# Patient Record
Sex: FEMALE | Race: WHITE | NOT HISPANIC OR LATINO | Employment: OTHER | URBAN - METROPOLITAN AREA
[De-identification: names, ages, dates, MRNs, and addresses within clinical notes are randomized per-mention and may not be internally consistent; named-entity substitution may affect disease eponyms.]

---

## 2023-04-17 LAB
ALANINE AMINOTRANSFERASE (SGPT) (U/L) IN SER/PLAS: 25 U/L (ref 7–45)
ALBUMIN (G/DL) IN SER/PLAS: 4.2 G/DL (ref 3.4–5)
ALKALINE PHOSPHATASE (U/L) IN SER/PLAS: 74 U/L (ref 33–136)
ANION GAP IN SER/PLAS: 12 MMOL/L (ref 10–20)
ASPARTATE AMINOTRANSFERASE (SGOT) (U/L) IN SER/PLAS: 28 U/L (ref 9–39)
BILIRUBIN TOTAL (MG/DL) IN SER/PLAS: 0.7 MG/DL (ref 0–1.2)
CALCIUM (MG/DL) IN SER/PLAS: 9.3 MG/DL (ref 8.6–10.3)
CARBON DIOXIDE, TOTAL (MMOL/L) IN SER/PLAS: 25 MMOL/L (ref 21–32)
CHLORIDE (MMOL/L) IN SER/PLAS: 105 MMOL/L (ref 98–107)
CREATININE (MG/DL) IN SER/PLAS: 0.58 MG/DL (ref 0.5–1.05)
ERYTHROCYTE DISTRIBUTION WIDTH (RATIO) BY AUTOMATED COUNT: 13.2 % (ref 11.5–14.5)
ERYTHROCYTE MEAN CORPUSCULAR HEMOGLOBIN CONCENTRATION (G/DL) BY AUTOMATED: 33.3 G/DL (ref 32–36)
ERYTHROCYTE MEAN CORPUSCULAR VOLUME (FL) BY AUTOMATED COUNT: 83 FL (ref 80–100)
ERYTHROCYTES (10*6/UL) IN BLOOD BY AUTOMATED COUNT: 5.41 X10E12/L (ref 4–5.2)
GFR FEMALE: 90 ML/MIN/1.73M2
GLUCOSE (MG/DL) IN SER/PLAS: 100 MG/DL (ref 74–99)
HEMATOCRIT (%) IN BLOOD BY AUTOMATED COUNT: 45 % (ref 36–46)
HEMOGLOBIN (G/DL) IN BLOOD: 15 G/DL (ref 12–16)
LEUKOCYTES (10*3/UL) IN BLOOD BY AUTOMATED COUNT: 5 X10E9/L (ref 4.4–11.3)
NRBC (PER 100 WBCS) BY AUTOMATED COUNT: 0 /100 WBC (ref 0–0)
PLATELETS (10*3/UL) IN BLOOD AUTOMATED COUNT: 230 X10E9/L (ref 150–450)
POTASSIUM (MMOL/L) IN SER/PLAS: 3.9 MMOL/L (ref 3.5–5.3)
PROTEIN TOTAL: 6.6 G/DL (ref 6.4–8.2)
SODIUM (MMOL/L) IN SER/PLAS: 138 MMOL/L (ref 136–145)
THYROTROPIN (MIU/L) IN SER/PLAS BY DETECTION LIMIT <= 0.05 MIU/L: 1.43 MIU/L (ref 0.44–3.98)
UREA NITROGEN (MG/DL) IN SER/PLAS: 11 MG/DL (ref 6–23)

## 2023-05-15 LAB
ALANINE AMINOTRANSFERASE (SGPT) (U/L) IN SER/PLAS: 16 U/L (ref 7–45)
ALBUMIN (G/DL) IN SER/PLAS: 4.3 G/DL (ref 3.4–5)
ALKALINE PHOSPHATASE (U/L) IN SER/PLAS: 64 U/L (ref 33–136)
ANION GAP IN SER/PLAS: 13 MMOL/L (ref 10–20)
ASPARTATE AMINOTRANSFERASE (SGOT) (U/L) IN SER/PLAS: 19 U/L (ref 9–39)
BILIRUBIN TOTAL (MG/DL) IN SER/PLAS: 0.7 MG/DL (ref 0–1.2)
CALCIUM (MG/DL) IN SER/PLAS: 9.6 MG/DL (ref 8.6–10.3)
CARBON DIOXIDE, TOTAL (MMOL/L) IN SER/PLAS: 25 MMOL/L (ref 21–32)
CHLORIDE (MMOL/L) IN SER/PLAS: 107 MMOL/L (ref 98–107)
CREATININE (MG/DL) IN SER/PLAS: 0.56 MG/DL (ref 0.5–1.05)
GFR FEMALE: 90 ML/MIN/1.73M2
GLUCOSE (MG/DL) IN SER/PLAS: 92 MG/DL (ref 74–99)
POTASSIUM (MMOL/L) IN SER/PLAS: 3.9 MMOL/L (ref 3.5–5.3)
PROTEIN TOTAL: 6.8 G/DL (ref 6.4–8.2)
SODIUM (MMOL/L) IN SER/PLAS: 141 MMOL/L (ref 136–145)
THYROPEROXIDASE AB (IU/ML) IN SER/PLAS: <28 IU/ML
THYROTROPIN (MIU/L) IN SER/PLAS BY DETECTION LIMIT <= 0.05 MIU/L: 2.17 MIU/L (ref 0.44–3.98)
UREA NITROGEN (MG/DL) IN SER/PLAS: 12 MG/DL (ref 6–23)

## 2023-05-16 LAB
COMPLETE PATHOLOGY REPORT: NORMAL
CONVERTED CLINICAL DIAGNOSIS-HISTORY: NORMAL
CONVERTED DIAGNOSIS COMMENT: NORMAL
CONVERTED FINAL DIAGNOSIS: NORMAL
CONVERTED FINAL REPORT PDF LINK TO COPY AND PASTE: NORMAL
CONVERTED SPECIMEN DESCRIPTION: NORMAL

## 2023-05-19 LAB — CALCITONIN: <2 PG/ML (ref 0–5.1)

## 2023-05-23 LAB — THYROID STIMULATING IMMUNOGLOBULIN: <1 TSI INDEX

## 2023-05-25 LAB — CANCER AG 19-9 (U/ML) IN SER/PLAS: 9.33 U/ML

## 2023-06-12 ENCOUNTER — HOSPITAL ENCOUNTER (OUTPATIENT)
Dept: DATA CONVERSION | Facility: HOSPITAL | Age: 83
End: 2023-06-12
Attending: INTERNAL MEDICINE | Admitting: INTERNAL MEDICINE
Payer: MEDICARE

## 2023-06-12 DIAGNOSIS — K31.89 OTHER DISEASES OF STOMACH AND DUODENUM: ICD-10-CM

## 2023-06-12 DIAGNOSIS — I10 ESSENTIAL (PRIMARY) HYPERTENSION: ICD-10-CM

## 2023-06-12 DIAGNOSIS — K86.89 OTHER SPECIFIED DISEASES OF PANCREAS (HHS-HCC): ICD-10-CM

## 2023-06-12 DIAGNOSIS — M06.9 RHEUMATOID ARTHRITIS, UNSPECIFIED (MULTI): ICD-10-CM

## 2023-06-12 DIAGNOSIS — E78.5 HYPERLIPIDEMIA, UNSPECIFIED: ICD-10-CM

## 2023-06-12 DIAGNOSIS — K21.9 GASTRO-ESOPHAGEAL REFLUX DISEASE WITHOUT ESOPHAGITIS: ICD-10-CM

## 2023-06-12 DIAGNOSIS — K58.9 IRRITABLE BOWEL SYNDROME WITHOUT DIARRHEA: ICD-10-CM

## 2023-06-12 DIAGNOSIS — F32.9 MAJOR DEPRESSIVE DISORDER, SINGLE EPISODE, UNSPECIFIED: ICD-10-CM

## 2023-06-12 DIAGNOSIS — M32.9 SYSTEMIC LUPUS ERYTHEMATOSUS, UNSPECIFIED (MULTI): ICD-10-CM

## 2023-06-12 DIAGNOSIS — R93.3 ABNORMAL FINDINGS ON DIAGNOSTIC IMAGING OF OTHER PARTS OF DIGESTIVE TRACT: ICD-10-CM

## 2023-06-12 DIAGNOSIS — K86.2 CYST OF PANCREAS (HHS-HCC): ICD-10-CM

## 2023-06-14 LAB
COMPLETE PATHOLOGY REPORT: NORMAL
COMPLETE PATHOLOGY REPORT: NORMAL
CONVERTED CLINICAL DIAGNOSIS-HISTORY: NORMAL
CONVERTED CLINICAL DIAGNOSIS-HISTORY: NORMAL
CONVERTED DIAGNOSIS COMMENT: NORMAL
CONVERTED FINAL DIAGNOSIS: NORMAL
CONVERTED FINAL DIAGNOSIS: NORMAL
CONVERTED FINAL REPORT PDF LINK TO COPY AND PASTE: NORMAL
CONVERTED FINAL REPORT PDF LINK TO COPY AND PASTE: NORMAL
CONVERTED GROSS DESCRIPTION: NORMAL
CONVERTED SPECIMEN DESCRIPTION: NORMAL

## 2023-09-07 VITALS — HEIGHT: 62 IN | BODY MASS INDEX: 25.45 KG/M2

## 2023-11-04 PROBLEM — L84 CALLUS OF FOOT: Status: ACTIVE | Noted: 2023-11-04

## 2023-11-04 PROBLEM — M06.09 SERONEGATIVE ARTHROPATHY OF MULTIPLE SITES (MULTI): Status: ACTIVE | Noted: 2023-11-04

## 2023-11-04 PROBLEM — N81.11 MIDLINE CYSTOCELE: Status: ACTIVE | Noted: 2023-11-04

## 2023-11-04 PROBLEM — M19.071 OSTEOARTHRITIS OF RIGHT ANKLE AND FOOT: Status: RESOLVED | Noted: 2023-11-04 | Resolved: 2023-11-04

## 2023-11-04 PROBLEM — M15.9 DJD (DEGENERATIVE JOINT DISEASE), MULTIPLE SITES: Status: ACTIVE | Noted: 2023-11-04

## 2023-11-04 PROBLEM — R41.3 MEMORY LOSS: Status: ACTIVE | Noted: 2023-11-04

## 2023-11-04 PROBLEM — D37.8 NEOPLASM OF UNCERTAIN BEHAVIOR OF PANCREAS: Status: ACTIVE | Noted: 2023-11-04

## 2023-11-04 PROBLEM — E78.5 ELEVATED LIPIDS: Status: ACTIVE | Noted: 2023-11-04

## 2023-11-04 PROBLEM — M48.062 SPINAL STENOSIS OF LUMBAR REGION WITH NEUROGENIC CLAUDICATION: Status: ACTIVE | Noted: 2023-11-04

## 2023-11-04 PROBLEM — B35.1 DERMATOPHYTOSIS, NAIL: Status: ACTIVE | Noted: 2023-11-04

## 2023-11-04 PROBLEM — L85.1 ACQUIRED PLANTAR KERATODERMA: Status: ACTIVE | Noted: 2023-11-04

## 2023-11-04 PROBLEM — K86.2 PANCREAS CYST (HHS-HCC): Status: ACTIVE | Noted: 2023-11-04

## 2023-11-04 PROBLEM — N64.4 BREAST PAIN: Status: ACTIVE | Noted: 2023-11-04

## 2023-11-04 PROBLEM — R30.0 DYSURIA: Status: ACTIVE | Noted: 2023-11-04

## 2023-11-04 PROBLEM — N95.2 VAGINAL ATROPHY: Status: ACTIVE | Noted: 2023-11-04

## 2023-11-04 PROBLEM — J32.9 CHRONIC SINUSITIS: Status: ACTIVE | Noted: 2023-11-04

## 2023-11-04 PROBLEM — R94.6 ABNORMAL THYROID SCAN: Status: ACTIVE | Noted: 2023-11-04

## 2023-11-04 PROBLEM — R35.1 NOCTURIA: Status: ACTIVE | Noted: 2023-11-04

## 2023-11-04 PROBLEM — M79.672 FOOT PAIN, BILATERAL: Status: ACTIVE | Noted: 2023-11-04

## 2023-11-04 PROBLEM — H90.3 SENSORINEURAL HEARING LOSS, BILATERAL: Status: ACTIVE | Noted: 2023-11-04

## 2023-11-04 PROBLEM — N39.41 URGE INCONTINENCE: Status: ACTIVE | Noted: 2023-11-04

## 2023-11-04 PROBLEM — K21.9 GERD (GASTROESOPHAGEAL REFLUX DISEASE): Status: ACTIVE | Noted: 2023-11-04

## 2023-11-04 PROBLEM — M85.80 OSTEOPENIA: Status: ACTIVE | Noted: 2023-11-04

## 2023-11-04 PROBLEM — H93.13 BILATERAL TINNITUS: Status: ACTIVE | Noted: 2023-11-04

## 2023-11-04 PROBLEM — R92.8 ABNORMAL MAMMOGRAM: Status: ACTIVE | Noted: 2023-11-04

## 2023-11-04 PROBLEM — M15.4 EROSIVE OSTEOARTHRITIS OF HAND: Status: ACTIVE | Noted: 2023-11-04

## 2023-11-04 PROBLEM — N64.89 BREAST ASYMMETRY: Status: ACTIVE | Noted: 2023-11-04

## 2023-11-04 PROBLEM — M19.042 ARTHRITIS OF BOTH HANDS: Status: ACTIVE | Noted: 2023-11-04

## 2023-11-04 PROBLEM — I10 BENIGN ESSENTIAL HYPERTENSION: Status: ACTIVE | Noted: 2023-11-04

## 2023-11-04 PROBLEM — J30.9 ALLERGIC RHINITIS: Status: ACTIVE | Noted: 2023-11-04

## 2023-11-04 PROBLEM — R19.7 FREQUENT LOOSE STOOLS: Status: ACTIVE | Noted: 2023-11-04

## 2023-11-04 PROBLEM — Z79.899 LONG-TERM USE OF HYDROXYCHLOROQUINE: Status: ACTIVE | Noted: 2023-11-04

## 2023-11-04 PROBLEM — I35.1 AORTIC VALVE INSUFFICIENCY, ACQUIRED: Status: ACTIVE | Noted: 2023-11-04

## 2023-11-04 PROBLEM — H93.8X9 EAR FULLNESS: Status: ACTIVE | Noted: 2023-11-04

## 2023-11-04 PROBLEM — M50.30 DEGENERATION OF INTERVERTEBRAL DISC OF CERVICAL REGION: Status: ACTIVE | Noted: 2023-11-04

## 2023-11-04 PROBLEM — E04.2 MULTINODULAR GOITER: Status: ACTIVE | Noted: 2023-11-04

## 2023-11-04 PROBLEM — Z96.1 BILATERAL PSEUDOPHAKIA: Status: ACTIVE | Noted: 2023-11-04

## 2023-11-04 PROBLEM — M06.00 SERONEGATIVE RHEUMATOID ARTHRITIS (MULTI): Status: ACTIVE | Noted: 2023-11-04

## 2023-11-04 PROBLEM — N81.6 CYSTOCELE WITH RECTOCELE: Status: ACTIVE | Noted: 2023-11-04

## 2023-11-04 PROBLEM — B00.1 RECURRENT COLD SORES: Status: ACTIVE | Noted: 2023-11-04

## 2023-11-04 PROBLEM — R91.1 LUNG NODULE: Status: ACTIVE | Noted: 2023-11-04

## 2023-11-04 PROBLEM — M12.9 ARTHROPATHY: Status: ACTIVE | Noted: 2023-11-04

## 2023-11-04 PROBLEM — M19.041 ARTHRITIS OF BOTH HANDS: Status: ACTIVE | Noted: 2023-11-04

## 2023-11-04 PROBLEM — M79.671 FOOT PAIN, BILATERAL: Status: ACTIVE | Noted: 2023-11-04

## 2023-11-04 PROBLEM — G31.84 MILD COGNITIVE IMPAIRMENT: Status: ACTIVE | Noted: 2023-11-04

## 2023-11-04 PROBLEM — Q24.8 PERICARDIAL CYST (HHS-HCC): Status: ACTIVE | Noted: 2023-11-04

## 2023-11-04 PROBLEM — M81.0 OSTEOPOROSIS, POST-MENOPAUSAL: Status: ACTIVE | Noted: 2023-11-04

## 2023-11-04 PROBLEM — T84.84XA PAINFUL ORTHOPAEDIC HARDWARE (CMS-HCC): Status: ACTIVE | Noted: 2023-11-04

## 2023-11-04 PROBLEM — Z79.899 LONG-TERM USE OF PLAQUENIL: Status: ACTIVE | Noted: 2023-11-04

## 2023-11-04 PROBLEM — N81.10 VAGINAL PROLAPSE: Status: ACTIVE | Noted: 2023-11-04

## 2023-11-04 PROBLEM — N81.4 CYSTOCELE WITH UTERINE PROLAPSE: Status: ACTIVE | Noted: 2023-11-04

## 2023-11-04 PROBLEM — S46.819A STRAIN OF TRAPEZIUS MUSCLE: Status: ACTIVE | Noted: 2023-11-04

## 2023-11-04 PROBLEM — N81.10 CYSTOCELE WITH RECTOCELE: Status: ACTIVE | Noted: 2023-11-04

## 2023-11-04 PROBLEM — N28.1 RENAL CYST: Status: ACTIVE | Noted: 2023-11-04

## 2023-11-04 PROBLEM — F41.9 ANXIETY: Status: ACTIVE | Noted: 2023-11-04

## 2023-11-04 PROBLEM — N39.46 URGE AND STRESS INCONTINENCE: Status: ACTIVE | Noted: 2023-11-04

## 2023-11-04 PROBLEM — M19.079 OSTEOARTHRITIS OF ANKLE OR FOOT: Status: ACTIVE | Noted: 2023-11-04

## 2023-11-04 PROBLEM — H35.363 DRUSEN OF MACULA OF BOTH EYES: Status: ACTIVE | Noted: 2023-11-04

## 2023-11-04 PROBLEM — M47.816 LUMBAR SPONDYLOSIS: Status: ACTIVE | Noted: 2023-11-04

## 2023-11-04 PROBLEM — Z87.898 HISTORY OF FIBROCYSTIC DISEASE OF BREAST: Status: ACTIVE | Noted: 2023-11-04

## 2023-11-04 PROBLEM — D72.9 ABNORMAL WHITE BLOOD CELL COUNT: Status: ACTIVE | Noted: 2023-11-04

## 2023-11-04 PROBLEM — I35.1 MODERATE AORTIC INSUFFICIENCY: Status: ACTIVE | Noted: 2023-11-04

## 2023-11-04 RX ORDER — ACYCLOVIR 400 MG/1
400 TABLET ORAL 2 TIMES DAILY PRN
COMMUNITY

## 2023-11-04 RX ORDER — NAPROXEN 375 MG/1
375 TABLET ORAL DAILY
COMMUNITY
Start: 2022-02-07

## 2023-11-04 RX ORDER — METOPROLOL SUCCINATE 25 MG/1
0.5 TABLET, EXTENDED RELEASE ORAL DAILY
COMMUNITY
Start: 2021-09-13 | End: 2024-05-07 | Stop reason: SDUPTHER

## 2023-11-04 RX ORDER — TROSPIUM CHLORIDE 20 MG/1
20 TABLET, FILM COATED ORAL 2 TIMES DAILY
COMMUNITY
Start: 2021-05-20 | End: 2024-03-11 | Stop reason: ALTCHOICE

## 2023-11-04 RX ORDER — AMLODIPINE BESYLATE 2.5 MG/1
1 TABLET ORAL DAILY
COMMUNITY
Start: 2021-08-22 | End: 2024-02-12 | Stop reason: SDUPTHER

## 2023-11-04 RX ORDER — ATORVASTATIN CALCIUM 10 MG/1
1 TABLET, FILM COATED ORAL DAILY
COMMUNITY
Start: 2020-09-28 | End: 2024-05-07 | Stop reason: SDUPTHER

## 2023-11-04 RX ORDER — ACETAMINOPHEN 325 MG/1
650 TABLET ORAL 4 TIMES DAILY
COMMUNITY
Start: 2022-06-06

## 2023-11-04 RX ORDER — ESTRADIOL 0.1 MG/G
CREAM VAGINAL
COMMUNITY
Start: 2022-02-10 | End: 2024-05-09 | Stop reason: SDUPTHER

## 2023-11-04 RX ORDER — UBIDECARENONE 60 MG
2 CAPSULE ORAL
COMMUNITY
End: 2024-03-18 | Stop reason: WASHOUT

## 2023-11-04 RX ORDER — SIMETHICONE 125 MG
1 TABLET,CHEWABLE ORAL 4 TIMES DAILY
COMMUNITY
End: 2024-03-18 | Stop reason: WASHOUT

## 2023-11-04 RX ORDER — MIRABEGRON 25 MG/1
25 TABLET, FILM COATED, EXTENDED RELEASE ORAL DAILY
COMMUNITY
Start: 2022-09-22 | End: 2024-03-11 | Stop reason: SDUPTHER

## 2023-11-04 RX ORDER — ONDANSETRON 4 MG/1
4 TABLET, FILM COATED ORAL EVERY 6 HOURS
COMMUNITY
Start: 2022-06-06 | End: 2024-03-11 | Stop reason: ALTCHOICE

## 2023-11-04 RX ORDER — MULTIVIT WITH MINERALS/HERBS
1 TABLET ORAL DAILY
COMMUNITY

## 2023-11-04 RX ORDER — SERTRALINE HYDROCHLORIDE 50 MG/1
50 TABLET, FILM COATED ORAL DAILY
COMMUNITY

## 2023-11-04 RX ORDER — ACETAMINOPHEN 500 MG
1 TABLET ORAL DAILY
COMMUNITY

## 2023-11-04 RX ORDER — CICLOPIROX 80 MG/ML
SOLUTION TOPICAL
COMMUNITY
Start: 2023-03-27

## 2023-11-04 RX ORDER — HYDROXYCHLOROQUINE SULFATE 200 MG/1
1 TABLET, FILM COATED ORAL DAILY
COMMUNITY
End: 2023-11-07 | Stop reason: SDUPTHER

## 2023-11-04 RX ORDER — EPINEPHRINE 0.22MG
100 AEROSOL WITH ADAPTER (ML) INHALATION DAILY
COMMUNITY

## 2023-11-04 RX ORDER — IBUPROFEN 600 MG/1
600 TABLET ORAL EVERY 6 HOURS
COMMUNITY
Start: 2022-06-06

## 2023-11-06 ENCOUNTER — OFFICE VISIT (OUTPATIENT)
Dept: OPHTHALMOLOGY | Facility: CLINIC | Age: 83
End: 2023-11-06
Payer: MEDICARE

## 2023-11-06 DIAGNOSIS — Z96.1 BILATERAL PSEUDOPHAKIA: ICD-10-CM

## 2023-11-06 DIAGNOSIS — M06.00 SERONEGATIVE RHEUMATOID ARTHRITIS (MULTI): ICD-10-CM

## 2023-11-06 DIAGNOSIS — H52.4 ASTIGMATISM OF BOTH EYES WITH PRESBYOPIA: Primary | ICD-10-CM

## 2023-11-06 DIAGNOSIS — H52.203 ASTIGMATISM OF BOTH EYES WITH PRESBYOPIA: Primary | ICD-10-CM

## 2023-11-06 DIAGNOSIS — Z79.899 ENCOUNTER FOR LONG-TERM (CURRENT) USE OF HIGH-RISK MEDICATION: ICD-10-CM

## 2023-11-06 PROCEDURE — 3074F SYST BP LT 130 MM HG: CPT | Performed by: OPTOMETRIST

## 2023-11-06 PROCEDURE — 92004 COMPRE OPH EXAM NEW PT 1/>: CPT | Performed by: OPTOMETRIST

## 2023-11-06 PROCEDURE — 1160F RVW MEDS BY RX/DR IN RCRD: CPT | Performed by: OPTOMETRIST

## 2023-11-06 PROCEDURE — 1159F MED LIST DOCD IN RCRD: CPT | Performed by: OPTOMETRIST

## 2023-11-06 PROCEDURE — 92015 DETERMINE REFRACTIVE STATE: CPT | Performed by: OPTOMETRIST

## 2023-11-06 PROCEDURE — 92083 EXTENDED VISUAL FIELD XM: CPT | Performed by: OPTOMETRIST

## 2023-11-06 PROCEDURE — 3078F DIAST BP <80 MM HG: CPT | Performed by: OPTOMETRIST

## 2023-11-06 PROCEDURE — 92134 CPTRZ OPH DX IMG PST SGM RTA: CPT | Mod: BILATERAL PROCEDURE | Performed by: OPTOMETRIST

## 2023-11-06 PROCEDURE — 1126F AMNT PAIN NOTED NONE PRSNT: CPT | Performed by: OPTOMETRIST

## 2023-11-06 ASSESSMENT — ENCOUNTER SYMPTOMS
CONSTITUTIONAL NEGATIVE: 0
ENDOCRINE NEGATIVE: 0
NEUROLOGICAL NEGATIVE: 0
RESPIRATORY NEGATIVE: 0
HEMATOLOGIC/LYMPHATIC NEGATIVE: 0
CARDIOVASCULAR NEGATIVE: 0
MUSCULOSKELETAL NEGATIVE: 0
PSYCHIATRIC NEGATIVE: 0
ALLERGIC/IMMUNOLOGIC NEGATIVE: 1
EYES NEGATIVE: 1
GASTROINTESTINAL NEGATIVE: 0

## 2023-11-06 ASSESSMENT — REFRACTION_WEARINGRX
OD_AXIS: 025
OD_SPHERE: +0.50
OD_CYLINDER: -0.50
OS_SPHERE: -0.25
OS_CYLINDER: -0.75
OD_ADD: +2.75
OS_AXIS: 150
OS_ADD: +2.75

## 2023-11-06 ASSESSMENT — REFRACTION
OD_CYLINDER: -0.50
OS_CYLINDER: -0.50
OD_ADD: +2.75
OD_ADD: +2.75
OD_SPHERE: +0.50
OD_AXIS: 025
OS_SPHERE: PLANO
OS_AXIS: 152
OS_ADD: +2.75
OS_AXIS: 152
OD_SPHERE: +0.50
OD_AXIS: 025
OS_SPHERE: PLANO
OD_CYLINDER: -0.50
OS_ADD: +2.75
OS_CYLINDER: -0.50

## 2023-11-06 ASSESSMENT — VISUAL ACUITY
METHOD: SNELLEN - LINEAR
OD_CC+: -2
CORRECTION_TYPE: GLASSES
OS_CC: 20/40
OD_CC: 20/25

## 2023-11-06 ASSESSMENT — EXTERNAL EXAM - LEFT EYE: OS_EXAM: NORMAL

## 2023-11-06 ASSESSMENT — REFRACTION_MANIFEST
OS_AXIS: 152
OS_SPHERE: -0.25
OS_CYLINDER: -0.50
OD_AXIS: 025
OD_CYLINDER: -0.50
OD_SPHERE: PLANO
OS_ADD: +2.75
OD_ADD: +2.75

## 2023-11-06 ASSESSMENT — CONF VISUAL FIELD
OS_NORMAL: 1
OS_INFERIOR_TEMPORAL_RESTRICTION: 0
OS_SUPERIOR_NASAL_RESTRICTION: 0
OS_INFERIOR_NASAL_RESTRICTION: 0
OD_SUPERIOR_TEMPORAL_RESTRICTION: 0
METHOD: COUNTING FINGERS
OD_NORMAL: 1
OD_INFERIOR_NASAL_RESTRICTION: 0
OD_SUPERIOR_NASAL_RESTRICTION: 0
OD_INFERIOR_TEMPORAL_RESTRICTION: 0
OS_SUPERIOR_TEMPORAL_RESTRICTION: 0

## 2023-11-06 ASSESSMENT — SLIT LAMP EXAM - LIDS
COMMENTS: NORMAL
COMMENTS: NORMAL

## 2023-11-06 ASSESSMENT — TONOMETRY
OD_IOP_MMHG: 14
OS_IOP_MMHG: 15
IOP_METHOD: GOLDMANN APPLANATION

## 2023-11-06 ASSESSMENT — EXTERNAL EXAM - RIGHT EYE: OD_EXAM: NORMAL

## 2023-11-06 NOTE — PROGRESS NOTES
Assessment/Plan   Diagnoses and all orders for this visit:  Astigmatism of both eyes with presbyopia  New spec rx released today per patient request. Ocular health wnl for age OU. Monitor 1 year or sooner prn. Refraction billed today.    Encounter for long-term (current) use of high-risk medication  -     OCT, Retina - OU - Both Eyes  -     Daugherty Visual Field - OU - Both Eyes  Seronegative rheumatoid arthritis (CMS/HCC)  Studies reveal that the risk of maculopathy when taking Plaquenil is 0% (0-5 years), 1% (over 5 years), 2% (over 10 years), and 20% cumulative, or 4% annual incidence (over 20 years) respectively.  Annual testing with 10-2 threshold visual field perimetry, as well as spectral domain optical coherence tomography of the macula is recommended. No signs of plaquenil toxicity today od/os, macula OCT today, HVF 10-2 today  monitor 1 year or sooner with any acute vision change. HVF 10-2 and OCT Mac at next comprehensive exam.    Bilateral pseudophakia  Stable. No PCO. Monitor.

## 2023-11-07 DIAGNOSIS — M06.00 SERONEGATIVE RHEUMATOID ARTHRITIS (MULTI): Primary | ICD-10-CM

## 2023-11-07 RX ORDER — HYDROXYCHLOROQUINE SULFATE 200 MG/1
200 TABLET, FILM COATED ORAL DAILY
Qty: 90 TABLET | Refills: 0 | Status: SHIPPED | OUTPATIENT
Start: 2023-11-07 | End: 2024-05-29 | Stop reason: SDUPTHER

## 2023-11-07 NOTE — TELEPHONE ENCOUNTER
[facility] mail order sent fax request for hydroxychlorquine 90 day supply

## 2023-11-08 ENCOUNTER — OFFICE VISIT (OUTPATIENT)
Dept: OTOLARYNGOLOGY | Facility: CLINIC | Age: 83
End: 2023-11-08
Payer: MEDICARE

## 2023-11-08 VITALS
RESPIRATION RATE: 16 BRPM | TEMPERATURE: 97.3 F | HEIGHT: 60 IN | HEART RATE: 52 BPM | BODY MASS INDEX: 27.32 KG/M2 | SYSTOLIC BLOOD PRESSURE: 178 MMHG | DIASTOLIC BLOOD PRESSURE: 71 MMHG | WEIGHT: 139.13 LBS

## 2023-11-08 DIAGNOSIS — J31.0 CHRONIC RHINITIS: ICD-10-CM

## 2023-11-08 DIAGNOSIS — R09.89 PHLEGM IN THROAT: ICD-10-CM

## 2023-11-08 DIAGNOSIS — J34.89 NASAL DRAINAGE: Primary | ICD-10-CM

## 2023-11-08 DIAGNOSIS — R09.89 CHRONIC THROAT CLEARING: ICD-10-CM

## 2023-11-08 DIAGNOSIS — J32.0 CHRONIC MAXILLARY SINUSITIS: ICD-10-CM

## 2023-11-08 PROCEDURE — 99204 OFFICE O/P NEW MOD 45 MIN: CPT | Performed by: STUDENT IN AN ORGANIZED HEALTH CARE EDUCATION/TRAINING PROGRAM

## 2023-11-08 PROCEDURE — 31231 NASAL ENDOSCOPY DX: CPT | Performed by: STUDENT IN AN ORGANIZED HEALTH CARE EDUCATION/TRAINING PROGRAM

## 2023-11-08 PROCEDURE — 1160F RVW MEDS BY RX/DR IN RCRD: CPT | Performed by: STUDENT IN AN ORGANIZED HEALTH CARE EDUCATION/TRAINING PROGRAM

## 2023-11-08 PROCEDURE — 3077F SYST BP >= 140 MM HG: CPT | Performed by: STUDENT IN AN ORGANIZED HEALTH CARE EDUCATION/TRAINING PROGRAM

## 2023-11-08 PROCEDURE — 99214 OFFICE O/P EST MOD 30 MIN: CPT | Performed by: STUDENT IN AN ORGANIZED HEALTH CARE EDUCATION/TRAINING PROGRAM

## 2023-11-08 PROCEDURE — 1126F AMNT PAIN NOTED NONE PRSNT: CPT | Performed by: STUDENT IN AN ORGANIZED HEALTH CARE EDUCATION/TRAINING PROGRAM

## 2023-11-08 PROCEDURE — 3078F DIAST BP <80 MM HG: CPT | Performed by: STUDENT IN AN ORGANIZED HEALTH CARE EDUCATION/TRAINING PROGRAM

## 2023-11-08 PROCEDURE — 1036F TOBACCO NON-USER: CPT | Performed by: STUDENT IN AN ORGANIZED HEALTH CARE EDUCATION/TRAINING PROGRAM

## 2023-11-08 PROCEDURE — 1159F MED LIST DOCD IN RCRD: CPT | Performed by: STUDENT IN AN ORGANIZED HEALTH CARE EDUCATION/TRAINING PROGRAM

## 2023-11-08 RX ORDER — IPRATROPIUM BROMIDE 21 UG/1
2 SPRAY, METERED NASAL 3 TIMES DAILY PRN
Qty: 30 ML | Refills: 12 | Status: SHIPPED | OUTPATIENT
Start: 2023-11-08 | End: 2024-11-07

## 2023-11-08 SDOH — ECONOMIC STABILITY: INCOME INSECURITY: IN THE LAST 12 MONTHS, WAS THERE A TIME WHEN YOU WERE NOT ABLE TO PAY THE MORTGAGE OR RENT ON TIME?: NO

## 2023-11-08 SDOH — ECONOMIC STABILITY: HOUSING INSECURITY
IN THE LAST 12 MONTHS, WAS THERE A TIME WHEN YOU DID NOT HAVE A STEADY PLACE TO SLEEP OR SLEPT IN A SHELTER (INCLUDING NOW)?: NO

## 2023-11-08 SDOH — ECONOMIC STABILITY: FOOD INSECURITY: WITHIN THE PAST 12 MONTHS, THE FOOD YOU BOUGHT JUST DIDN'T LAST AND YOU DIDN'T HAVE MONEY TO GET MORE.: NEVER TRUE

## 2023-11-08 SDOH — ECONOMIC STABILITY: FOOD INSECURITY: WITHIN THE PAST 12 MONTHS, YOU WORRIED THAT YOUR FOOD WOULD RUN OUT BEFORE YOU GOT MONEY TO BUY MORE.: NEVER TRUE

## 2023-11-08 ASSESSMENT — ENCOUNTER SYMPTOMS
LOSS OF SENSATION IN FEET: 0
DEPRESSION: 0
OCCASIONAL FEELINGS OF UNSTEADINESS: 1

## 2023-11-08 ASSESSMENT — COLUMBIA-SUICIDE SEVERITY RATING SCALE - C-SSRS
6. HAVE YOU EVER DONE ANYTHING, STARTED TO DO ANYTHING, OR PREPARED TO DO ANYTHING TO END YOUR LIFE?: NO
2. HAVE YOU ACTUALLY HAD ANY THOUGHTS OF KILLING YOURSELF?: NO
1. IN THE PAST MONTH, HAVE YOU WISHED YOU WERE DEAD OR WISHED YOU COULD GO TO SLEEP AND NOT WAKE UP?: NO

## 2023-11-08 ASSESSMENT — PATIENT HEALTH QUESTIONNAIRE - PHQ9
SUM OF ALL RESPONSES TO PHQ9 QUESTIONS 1 AND 2: 0
2. FEELING DOWN, DEPRESSED OR HOPELESS: NOT AT ALL
1. LITTLE INTEREST OR PLEASURE IN DOING THINGS: NOT AT ALL

## 2023-11-08 ASSESSMENT — PAIN SCALES - GENERAL: PAINLEVEL: 0-NO PAIN

## 2023-11-08 NOTE — PROGRESS NOTES
SUBJECTIVE  Patient ID: Ludivina Erwin is a 83 y.o. female who presents for New Patient Visit (Runny nose).    Here today with her daughter.    She reports 3 years of a chronically runny nose. She notes bilateral drainage, worse on the right. This is usually clear. Not associated with meals. She notes thick phlegm which requires frequent throat clearing. They deny nasal congestion, nasal obstruction, facial pressure, epistaxis, and change in sense of smell. They deny a history of environmental allergies; no formal allergy testing. They deny a history of nasal/sinus surgery/trauma.  Denies a history of acid reflux disease.    Previously tried steroid spray (Flonase) without improvement.     Review of Systems  Complete ROS negative except as noted above or on patient intake form and as above.    OBJECTIVE  Physical Exam  CONSTITUTIONAL: Well appearing female who appears stated age.  PSYCHIATRIC: Alert, appropriate mood and affect.  RESPIRATORY: Normal inspiration and expiration and chest wall expansion; no use of accessory muscles to breathe.  VOICE: Clear speech without hoarseness. No stridor nor stertor.  HEAD, FACE, AND SKIN: Symmetric facial feature. No cutaneous masses or lesions were visualized. The parotid and submandibular glands were normal to palpation.  EYES: Pupils were equal in size and reactive to light. Extra-ocular muscle function was intact. No nystagmus was observed. Vision was grossly intact.  EARS: External ears were normally formed with no lesions. The external auditory canals were slightly narrow with non-obstructive cerumen. The tympanic membranes were intact and in the neutral position. No significant retraction pockets nor effusions were appreciated.  NOSE: Nasal dorsum was midline. Anterior rhinoscopy demonstrated a septum relatively midline. Inferior turbinates were moderately hypertrophied. Middle meatus clear bilaterally. No obvious nasal masses, polyps, mucopurulence, nor other lesions  were appreciated.  ORAL CAVITY: Lips were without lesions. Moist mucous membranes. No lesions appreciated along the gingiva, oral mucosa, nor tongue.  DENTITION: Grossly normal without obvious infection nor inflammation.  OROPHARYNX: No lesion nor mucosal abnormality. The uvula was normal appearing. The tonsils were surgically absent.  NECK: Visualization and palpation of the neck revealed no mass lesions, no thyromegaly or thyroid masses. No cutaneous lesions appreciated.  LYMPHATICS (CERVICAL): There were no palpable lymph nodes in the posterior triangle, submandibular triangle, jugulodigastric region, nor central neck.  NEUROLOGIC: Cranial nerves III, IV, and VI were noted to be intact via extra-ocular muscle movement testing. Cranial nerve V was noted to be intact to soft touch bilaterally. Cranial nerve VII was noted to be intact and symmetric by facial movement. Cranial nerve VIII was tested with normal voice examination and revealed grossly normal hearing. Cranial nerves IX and X noted to be intact by palatal movement. Cranial nerve XI noted to be intact via shoulder shrug.  Cranial nerve XII noted to be intact with active and symmetric tongue movement.      Radiology  CT max-face 3/24/2023:  I personally reviewed the patient's prior imaging from March 2023.  There is mucosal thickening within the right maxillary sinus consistent with multiple mucous retention cyst versus chronic sinusitis.  There is mucosal thickening that is fairly mild and ethmoid air cells bilaterally.  Otherwise the paranasal sinuses are clear.    --------------------------------------------------  Procedure: Nasal endoscopy - Diagnostic  Indication: Chronic rhinitis, nasal drainage  Informed consent verbally obtained: The risks, benefits, alternatives, and expectations were discussed with the patient, who wished to proceed  Anesthesia: n/a (lidocaine allergy in chart)    Findings: After topical anesthetic was applied the nasal cavities  were examined with a flexible endoscope. The septum was relatively midline.  - Right: The inferior turbinate was moderately hypertrophied. The middle turbinate appeared healthy; the middle meatus and spheno-ethmoid recess were free of purulence, masses, lesions, or polyps. The nasal passageway is patent. The nasopharynx was clear.  - Left: The inferior turbinate was moderately hypertrophied. The middle turbinate appeared healthy; the middle meatus and spheno-ethmoid recess were free of purulence, masses, lesions, or polyps. The nasal passageway is patent. The nasopharynx was clear.    Clear drainage appreciated throughout both nasal cavities.  Patient did not tolerate laryngoscopy.    There were no complications and the patient tolerated the procedure well.  --------------------------------------------------    ASSESSMENT/PLAN  Diagnoses and all orders for this visit:  Nasal drainage  -     ipratropium (Atrovent) 21 mcg (0.03 %) nasal spray; Administer 2 sprays into each nostril 3 times a day as needed for rhinitis (nasal drainage).  Chronic rhinitis  -     ipratropium (Atrovent) 21 mcg (0.03 %) nasal spray; Administer 2 sprays into each nostril 3 times a day as needed for rhinitis (nasal drainage).  Chronic maxillary sinusitis  Phlegm in throat  Chronic throat clearing    83 y.o. female presenting with longstanding difficulty with chronic nasal drainage.    1.  Chronic rhinitis, nasal drainage, chronic maxillary sinusitis, throat phlegm/clearing  The patient reports at least several years of constant nasal drainage described as clear but worse from the right nasal cavity.  Her exam is relatively benign but she has recent imaging that demonstrates multiple opacifications within the right maxillary sinus consistent with either chronic sinusitis or multiple mucous retention cysts.    Endoscopy: Some clear drainage but otherwise no evidence of chronic sinusitis  Imaging: CT max-face - partial opacification of the right  maxillary sinus - rounded lesions consistent with mucous retention cysts, mild ethmoid disease    We discussed the patient's options moving forward including trialing other medical therapies (patient failed fluticasone nasal spray) versus consideration of further treatment and work-up of chronic sinusitis.  Patient is interested in trialing ipratropium nasal spray to see if there is significant improvement.  Depending on her response to this we could consider some procedural interventions including cryoablation and limited sinus surgery.    This note was created using speech recognition transcription software. Despite proofreading, typographical errors may be present that affect the meaning of the content. Please contact my office with any questions.

## 2023-11-27 ENCOUNTER — TELEPHONE (OUTPATIENT)
Dept: UROLOGY | Facility: CLINIC | Age: 83
End: 2023-11-27
Payer: MEDICARE

## 2023-11-27 DIAGNOSIS — N39.41 URGE INCONTINENCE: Primary | ICD-10-CM

## 2023-11-27 RX ORDER — MIRABEGRON 25 MG/1
25 TABLET, FILM COATED, EXTENDED RELEASE ORAL DAILY
Qty: 100 TABLET | Refills: 2 | Status: SHIPPED | OUTPATIENT
Start: 2023-11-27 | End: 2024-04-12 | Stop reason: SDUPTHER

## 2023-11-27 NOTE — TELEPHONE ENCOUNTER
Both the patient's POA and [facility] contacted us for a refill for her Myrbetriq 25mg. Please print so I can fax to them. Pt not due for her annual follow up appt until March 2024. Detailed message left on Ruthie's (POA) VM informing her of that and that we are sending the rx to [facility], thanks.

## 2023-12-04 ENCOUNTER — APPOINTMENT (OUTPATIENT)
Dept: PRIMARY CARE | Facility: CLINIC | Age: 83
End: 2023-12-04
Payer: MEDICARE

## 2024-01-10 ENCOUNTER — APPOINTMENT (OUTPATIENT)
Dept: RHEUMATOLOGY | Facility: CLINIC | Age: 84
End: 2024-01-10
Payer: MEDICARE

## 2024-01-22 ENCOUNTER — OFFICE VISIT (OUTPATIENT)
Dept: OTOLARYNGOLOGY | Facility: CLINIC | Age: 84
End: 2024-01-22
Payer: MEDICARE

## 2024-01-22 VITALS
RESPIRATION RATE: 18 BRPM | HEART RATE: 57 BPM | DIASTOLIC BLOOD PRESSURE: 76 MMHG | OXYGEN SATURATION: 99 % | TEMPERATURE: 96.4 F | SYSTOLIC BLOOD PRESSURE: 142 MMHG

## 2024-01-22 DIAGNOSIS — J32.0 CHRONIC MAXILLARY SINUSITIS: Primary | ICD-10-CM

## 2024-01-22 DIAGNOSIS — J34.89 NASAL DRAINAGE: ICD-10-CM

## 2024-01-22 DIAGNOSIS — J31.0 CHRONIC RHINITIS: ICD-10-CM

## 2024-01-22 PROCEDURE — 1126F AMNT PAIN NOTED NONE PRSNT: CPT | Performed by: STUDENT IN AN ORGANIZED HEALTH CARE EDUCATION/TRAINING PROGRAM

## 2024-01-22 PROCEDURE — 1160F RVW MEDS BY RX/DR IN RCRD: CPT | Performed by: STUDENT IN AN ORGANIZED HEALTH CARE EDUCATION/TRAINING PROGRAM

## 2024-01-22 PROCEDURE — 3078F DIAST BP <80 MM HG: CPT | Performed by: STUDENT IN AN ORGANIZED HEALTH CARE EDUCATION/TRAINING PROGRAM

## 2024-01-22 PROCEDURE — 99212 OFFICE O/P EST SF 10 MIN: CPT | Performed by: STUDENT IN AN ORGANIZED HEALTH CARE EDUCATION/TRAINING PROGRAM

## 2024-01-22 PROCEDURE — 1036F TOBACCO NON-USER: CPT | Performed by: STUDENT IN AN ORGANIZED HEALTH CARE EDUCATION/TRAINING PROGRAM

## 2024-01-22 PROCEDURE — 3077F SYST BP >= 140 MM HG: CPT | Performed by: STUDENT IN AN ORGANIZED HEALTH CARE EDUCATION/TRAINING PROGRAM

## 2024-01-22 PROCEDURE — 1159F MED LIST DOCD IN RCRD: CPT | Performed by: STUDENT IN AN ORGANIZED HEALTH CARE EDUCATION/TRAINING PROGRAM

## 2024-01-22 ASSESSMENT — ENCOUNTER SYMPTOMS
DEPRESSION: 0
LOSS OF SENSATION IN FEET: 0
OCCASIONAL FEELINGS OF UNSTEADINESS: 0

## 2024-01-22 ASSESSMENT — PAIN SCALES - GENERAL: PAINLEVEL: 0-NO PAIN

## 2024-01-22 NOTE — PROGRESS NOTES
SUBJECTIVE  Patient ID: Ludivina Erwin is a 83 y.o. female who presents for Follow-up (sinus).    History 11/8/2023: Here today with her daughter.  She reports 3 years of a chronically runny nose. She notes bilateral drainage, worse on the right. This is usually clear. Not associated with meals. She notes thick phlegm which requires frequent throat clearing. They deny nasal congestion, nasal obstruction, facial pressure, epistaxis, and change in sense of smell. They deny a history of environmental allergies; no formal allergy testing. They deny a history of nasal/sinus surgery/trauma.  Denies a history of acid reflux disease.    Previously tried steroid spray (Flonase) without improvement.     Update 1/22/2024: Here today with her daughter.  Follow-up for chronic rhinitis. She has noticed some improvement with ipratropium bromide; just doesn't remember to use it all the time.  Continues to have clear drainage.    OBJECTIVE  Physical Exam  CONSTITUTIONAL: Well appearing female who appears stated age.  PSYCHIATRIC: Alert, appropriate mood and affect.  RESPIRATORY: Normal inspiration and expiration and chest wall expansion; no use of accessory muscles to breathe.  VOICE: Clear speech without hoarseness. No stridor nor stertor.  HEAD, FACE, AND SKIN: Symmetric facial feature.  NOSE: Nasal dorsum was midline. Anterior rhinoscopy demonstrated a septum relatively midline. Inferior turbinates were moderately hypertrophied. Middle meatus clear bilaterally.  No drainage today.  No obvious nasal masses, polyps, mucopurulence, nor other lesions were appreciated.  OROPHARYNX: No lesion nor mucosal abnormality. The uvula was normal appearing. The tonsils were surgically absent.  No drainage.    Radiology  CT max-face 3/24/2023:      ASSESSMENT/PLAN  Diagnoses and all orders for this visit:  Chronic maxillary sinusitis  Chronic rhinitis  Nasal drainage    83 y.o. female presenting with longstanding difficulty with chronic nasal  drainage.    1.  Chronic rhinitis, nasal drainage, chronic maxillary sinusitis, throat phlegm/clearing  The patient reports at least several years of constant nasal drainage described as clear but worse from the right nasal cavity.  Her exam is relatively benign but she has recent imaging that demonstrates multiple opacifications within the right maxillary sinus consistent with either chronic sinusitis or multiple mucous retention cysts.    Endoscopy: Some clear drainage but otherwise no evidence of chronic sinusitis  Imaging: CT max-face - partial opacification of the right maxillary sinus - rounded lesions consistent with mucous retention cysts, mild ethmoid disease    The patient has now trialed ipratropium bromide with some improvement in nasal drainage.  Previously trialed fluticasone nasal spray without improvement.  We discussed her options including continued medical therapy, repeat imaging, and/or procedural intervention (cryoablation with limited sinus surgery).  Patient is interested in avoiding procedures of possible and would like to continue her medical therapy at this time.  She will follow-up in 6 months to check on her progress.    This note was created using speech recognition transcription software. Despite proofreading, typographical errors may be present that affect the meaning of the content. Please contact my office with any questions.

## 2024-02-12 ENCOUNTER — TELEPHONE (OUTPATIENT)
Dept: PRIMARY CARE | Facility: CLINIC | Age: 84
End: 2024-02-12
Payer: MEDICARE

## 2024-02-12 DIAGNOSIS — I10 PRIMARY HYPERTENSION: Primary | ICD-10-CM

## 2024-02-12 RX ORDER — AMLODIPINE BESYLATE 2.5 MG/1
2.5 TABLET ORAL DAILY
Qty: 90 TABLET | Refills: 0 | Status: SHIPPED | OUTPATIENT
Start: 2024-02-12 | End: 2024-05-07 | Stop reason: SDUPTHER

## 2024-02-12 NOTE — TELEPHONE ENCOUNTER
Patient of Becki Coronado  Prescription refill request    Amlodipine 2.5 mg    Pharmacy  [facility] mail order

## 2024-03-04 ENCOUNTER — HOSPITAL ENCOUNTER (OUTPATIENT)
Dept: RADIOLOGY | Facility: CLINIC | Age: 84
Discharge: HOME | End: 2024-03-04
Payer: MEDICARE

## 2024-03-04 DIAGNOSIS — R91.1 SOLITARY PULMONARY NODULE: ICD-10-CM

## 2024-03-04 PROCEDURE — 71250 CT THORAX DX C-: CPT | Performed by: STUDENT IN AN ORGANIZED HEALTH CARE EDUCATION/TRAINING PROGRAM

## 2024-03-04 PROCEDURE — 71250 CT THORAX DX C-: CPT

## 2024-03-11 ENCOUNTER — OFFICE VISIT (OUTPATIENT)
Dept: PRIMARY CARE | Facility: CLINIC | Age: 84
End: 2024-03-11
Payer: MEDICARE

## 2024-03-11 ENCOUNTER — HOSPITAL ENCOUNTER (OUTPATIENT)
Dept: CARDIOLOGY | Facility: CLINIC | Age: 84
Discharge: HOME | End: 2024-03-11
Payer: MEDICARE

## 2024-03-11 VITALS
WEIGHT: 138.6 LBS | HEART RATE: 52 BPM | HEIGHT: 62 IN | SYSTOLIC BLOOD PRESSURE: 156 MMHG | OXYGEN SATURATION: 99 % | TEMPERATURE: 98.2 F | RESPIRATION RATE: 16 BRPM | DIASTOLIC BLOOD PRESSURE: 67 MMHG | BODY MASS INDEX: 25.51 KG/M2

## 2024-03-11 VITALS
WEIGHT: 139 LBS | DIASTOLIC BLOOD PRESSURE: 76 MMHG | BODY MASS INDEX: 27.29 KG/M2 | SYSTOLIC BLOOD PRESSURE: 142 MMHG | HEIGHT: 60 IN

## 2024-03-11 DIAGNOSIS — J06.9 UPPER RESPIRATORY INFECTION WITH COUGH AND CONGESTION: Primary | ICD-10-CM

## 2024-03-11 DIAGNOSIS — I35.1 AORTIC VALVE INSUFFICIENCY, ACQUIRED: ICD-10-CM

## 2024-03-11 DIAGNOSIS — J02.9 SORE THROAT: ICD-10-CM

## 2024-03-11 DIAGNOSIS — Z00.00 MEDICARE ANNUAL WELLNESS VISIT, SUBSEQUENT: ICD-10-CM

## 2024-03-11 DIAGNOSIS — I06.1 RHEUMATIC AORTIC INSUFFICIENCY: ICD-10-CM

## 2024-03-11 DIAGNOSIS — R09.81 NASAL CONGESTION: ICD-10-CM

## 2024-03-11 PROBLEM — H04.123 DRY EYE SYNDROME, BILATERAL: Status: ACTIVE | Noted: 2017-12-18

## 2024-03-11 PROBLEM — R10.9 ABDOMINAL PAIN: Status: ACTIVE | Noted: 2024-03-11

## 2024-03-11 PROBLEM — N39.0 ACUTE URINARY TRACT INFECTION: Status: ACTIVE | Noted: 2024-03-11

## 2024-03-11 PROBLEM — H61.20 IMPACTED CERUMEN: Status: ACTIVE | Noted: 2024-03-11

## 2024-03-11 LAB
AORTIC VALVE MEAN GRADIENT: 2.1 MMHG
AORTIC VALVE PEAK VELOCITY: 1.11 M/S
AV PEAK GRADIENT: 5 MMHG
AVA (PEAK VEL): 3.05 CM2
AVA (VTI): 2.81 CM2
EJECTION FRACTION APICAL 4 CHAMBER: 60.5
EJECTION FRACTION: 61 %
LEFT ATRIUM VOLUME AREA LENGTH INDEX BSA: 31 ML/M2
LEFT VENTRICLE INTERNAL DIMENSION DIASTOLE: 3.9 CM (ref 3.5–6)
LEFT VENTRICULAR OUTFLOW TRACT DIAMETER: 2.14 CM
MITRAL VALVE E/A RATIO: 0.71
POC RAPID STREP: NEGATIVE
RIGHT VENTRICLE FREE WALL PEAK S': 1.6 CM/S
RIGHT VENTRICLE PEAK SYSTOLIC PRESSURE: 28 MMHG
TRICUSPID ANNULAR PLANE SYSTOLIC EXCURSION: 2.3 CM

## 2024-03-11 PROCEDURE — 93306 TTE W/DOPPLER COMPLETE: CPT

## 2024-03-11 PROCEDURE — 3078F DIAST BP <80 MM HG: CPT | Performed by: NURSE PRACTITIONER

## 2024-03-11 PROCEDURE — 1157F ADVNC CARE PLAN IN RCRD: CPT | Performed by: NURSE PRACTITIONER

## 2024-03-11 PROCEDURE — 87880 STREP A ASSAY W/OPTIC: CPT | Mod: QW,ZK | Performed by: NURSE PRACTITIONER

## 2024-03-11 PROCEDURE — 3077F SYST BP >= 140 MM HG: CPT | Performed by: NURSE PRACTITIONER

## 2024-03-11 PROCEDURE — 93306 TTE W/DOPPLER COMPLETE: CPT | Performed by: INTERNAL MEDICINE

## 2024-03-11 PROCEDURE — 99213 OFFICE O/P EST LOW 20 MIN: CPT | Performed by: NURSE PRACTITIONER

## 2024-03-11 PROCEDURE — 87636 SARSCOV2 & INF A&B AMP PRB: CPT | Mod: PARLAB | Performed by: NURSE PRACTITIONER

## 2024-03-11 PROCEDURE — 1160F RVW MEDS BY RX/DR IN RCRD: CPT | Performed by: NURSE PRACTITIONER

## 2024-03-11 PROCEDURE — 1125F AMNT PAIN NOTED PAIN PRSNT: CPT | Performed by: NURSE PRACTITIONER

## 2024-03-11 PROCEDURE — 1036F TOBACCO NON-USER: CPT | Performed by: NURSE PRACTITIONER

## 2024-03-11 PROCEDURE — 1159F MED LIST DOCD IN RCRD: CPT | Performed by: NURSE PRACTITIONER

## 2024-03-11 PROCEDURE — 99213 OFFICE O/P EST LOW 20 MIN: CPT | Mod: ZK,25 | Performed by: NURSE PRACTITIONER

## 2024-03-11 RX ORDER — CEPHALEXIN 500 MG/1
500 CAPSULE ORAL 2 TIMES DAILY
Qty: 14 CAPSULE | Refills: 0 | Status: SHIPPED | OUTPATIENT
Start: 2024-03-11 | End: 2024-03-18

## 2024-03-11 ASSESSMENT — LIFESTYLE VARIABLES
HOW OFTEN DO YOU HAVE A DRINK CONTAINING ALCOHOL: NEVER
SKIP TO QUESTIONS 9-10: 1
HOW MANY STANDARD DRINKS CONTAINING ALCOHOL DO YOU HAVE ON A TYPICAL DAY: PATIENT DOES NOT DRINK
HOW OFTEN DO YOU HAVE SIX OR MORE DRINKS ON ONE OCCASION: NEVER
AUDIT-C TOTAL SCORE: 0

## 2024-03-11 ASSESSMENT — ENCOUNTER SYMPTOMS
DEPRESSION: 0
OCCASIONAL FEELINGS OF UNSTEADINESS: 1
LOSS OF SENSATION IN FEET: 0

## 2024-03-11 ASSESSMENT — PAIN SCALES - GENERAL: PAINLEVEL: 4

## 2024-03-11 NOTE — PROGRESS NOTES
"Subjective   Patient ID: Ludivina Erwin is a 83 y.o. female who presents for Follow-up (Ct follow up).  HPI 83-year-old female presents today to review CT scan.  She is complaining of nasal congestion and sore throat for the past week.  Daughter states she tested the father for COVID who was negative so she assumed mother was negative as well.  She is currently taking Mucinex DM and Tylenol.  She denies fever or chills.  No nausea vomiting or diarrhea.    Discussed with patient and daughter CT chest results.  Thyroid nodule already biopsied and evaluated.  Pericardial cyst - echo and hour ago.  Dr. Lane following.   Stable rll 6 mm and 5 mm ground glass - stable since 2017 per report.  No further action required.    1/10 right sciatic discomfort. Improving.      Review of Systems  Review of systems: Present-not feeling well. Not present-chills, fatigue and fever.  Skin: Not present-change in wart/mole, lesions.  HEENT: Nasal congestion and sore throat.  Not present-headache, ear pain.  Neck: Not present-neck pain, neck stiffness and swollen glands.  Respiratory: Not present-difficulty breathing, cough, bloody sputum.  Cardiovascular: Not present-chest pain, palpitations, dyspnea on exertion.  Gastrointestinal: Not present-abdominal pain, bloody or very black stools, heartburn, jaundice, nausea and vomiting.  Genitourinary: Not present-change in bladder habits, hematuria, flank pain and dysuria.  Musculoskeletal: Not present-back pain, claudication, joint pain, joint swelling, joint redness, and muscular weakness.    Objective   /67   Pulse 52   Temp 36.8 °C (98.2 °F)   Resp 16   Ht 1.575 m (5' 2\")   Wt 62.9 kg (138 lb 9.6 oz)   SpO2 99%   BMI 25.35 kg/m²    She did take her blood pressure medications a couple of hours ago.   Repeat 150/70  Had echo earlier today which made her a little nervous.       Physical Exam  Gen.: Mental status-alert. Gen. appearance-cooperative, well groomed and consistent " with stated age. Not in acute distress or sickly. Orientation-oriented to time, place, purpose and person. Build and nutrition-well-nourished and well-developed. Hydration-well-hydrated.    Integumentary: Color-normal coloration skin. Skin moisture-normal skin moisture.    Head and neck: Head-normocephalic, atraumatic with no lesions or palpable masses. Face-atraumatic. Neck-full range of motion and subtle. No lymphadenopathy and no nuchal rigidity.     ENMT: Ears-no tenderness noted to the external auditory canal-bilaterally. Otoscopic exam: Tympanic membranes-left-tympanic membrane is gray in appearance. Right-tympanic membrane is gray in appearance. Nose -frontal sinuses-tender and purulent drainage. Maxillary sinuses- tender and purulent drainage. Mouth: Oral mucosa-pink and moist. Oropharynx: No airway distress, bulging of the pharyngeal wall, edema of the uvula.  Mild to moderate pharyngeal erythema.    Chest and lung exam:  Auscultation-normal breath sounds, no adventitious lung sounds and normal vocal resonance. Chest wall is normal in shape and non-tender.    Cardiovascular: Auscultation: Regular rate and rhythm. Heart sounds-normal heart sounds, S1-S2. No murmurs or gallops appreciated. Carotid arteries normal and without bruit.    Abdomen: Non-tender, no rigidity, and no palpable masses. There is no hepatosplenomegaly. Auscultation-auscultation of the abdomen reveals normal bowel sounds throughout.     Peripheral vascular: Lower extremity-inspection is normal bilaterally. Normal temperature and no edema bilaterally.    Musculoskeletal: Examination of the spine with no step-offs or point tenderness.  Full range of motion of the spine without pain or difficulty. Right lower extremity-normal strength and tone, normal range of motion without pain. Left lower extremity-normal strength and tone, normal range of motion without pain.  Assessment/Plan   Diagnoses and all orders for this visit:  Upper respiratory  infection with cough and congestion  -     Sars-CoV-2 and Influenza A/B PCR  -     cephalexin (Keflex) 500 mg capsule; Take 1 capsule (500 mg) by mouth 2 times a day for 7 days.  Sore throat  -     POCT rapid strep A manually resulted  -     Sars-CoV-2 and Influenza A/B PCR  Nasal congestion  -     Sars-CoV-2 and Influenza A/B PCR  Medicare annual wellness visit, subsequent  -     Follow Up In Primary Care - Established; Future

## 2024-03-11 NOTE — PATIENT INSTRUCTIONS
Follow up to CT Chest for lung nodule surveillance - stable lung nodules since 2017 per radiology report.   Per Fleishner Guidelines no further action would be required.       Upper respiratory tract infection with cough.  Patient was encouraged to increase fluids and rest.  Strep negative.   Covid and flu obtained.  She will be notified of results as they become available.  Continue with supportive care.  She was given a prescription for Keflex as directed.  Contact office with worsening condition or no resolve over the next 72 hours.

## 2024-03-12 LAB
FLUAV RNA RESP QL NAA+PROBE: NOT DETECTED
FLUBV RNA RESP QL NAA+PROBE: NOT DETECTED
SARS-COV-2 RNA RESP QL NAA+PROBE: NOT DETECTED

## 2024-03-18 ENCOUNTER — OFFICE VISIT (OUTPATIENT)
Dept: CARDIOLOGY | Facility: CLINIC | Age: 84
End: 2024-03-18
Payer: MEDICARE

## 2024-03-18 VITALS
HEART RATE: 56 BPM | SYSTOLIC BLOOD PRESSURE: 148 MMHG | DIASTOLIC BLOOD PRESSURE: 78 MMHG | HEIGHT: 62 IN | OXYGEN SATURATION: 94 % | WEIGHT: 136.2 LBS | BODY MASS INDEX: 25.06 KG/M2

## 2024-03-18 DIAGNOSIS — Q24.8 PERICARDIAL CYST (HHS-HCC): ICD-10-CM

## 2024-03-18 DIAGNOSIS — I35.1 MODERATE AORTIC INSUFFICIENCY: Primary | ICD-10-CM

## 2024-03-18 DIAGNOSIS — K21.9 GASTROESOPHAGEAL REFLUX DISEASE WITHOUT ESOPHAGITIS: ICD-10-CM

## 2024-03-18 DIAGNOSIS — I10 BENIGN ESSENTIAL HYPERTENSION: ICD-10-CM

## 2024-03-18 PROCEDURE — 1160F RVW MEDS BY RX/DR IN RCRD: CPT | Performed by: INTERNAL MEDICINE

## 2024-03-18 PROCEDURE — 3075F SYST BP GE 130 - 139MM HG: CPT | Performed by: INTERNAL MEDICINE

## 2024-03-18 PROCEDURE — 93000 ELECTROCARDIOGRAM COMPLETE: CPT | Performed by: INTERNAL MEDICINE

## 2024-03-18 PROCEDURE — 99214 OFFICE O/P EST MOD 30 MIN: CPT | Performed by: INTERNAL MEDICINE

## 2024-03-18 PROCEDURE — 1036F TOBACCO NON-USER: CPT | Performed by: INTERNAL MEDICINE

## 2024-03-18 PROCEDURE — 1157F ADVNC CARE PLAN IN RCRD: CPT | Performed by: INTERNAL MEDICINE

## 2024-03-18 PROCEDURE — 1159F MED LIST DOCD IN RCRD: CPT | Performed by: INTERNAL MEDICINE

## 2024-03-18 PROCEDURE — 3078F DIAST BP <80 MM HG: CPT | Performed by: INTERNAL MEDICINE

## 2024-03-18 NOTE — PROGRESS NOTES
"  Subjective  Ludivina Erwin  is a 83 y.o. year old female who presents for aoritc insufficiency and preicardial cyst.  Notes shoulder and neck pain from arthrtis .  No chest pain, no palpitations no dyspnea, no edema.    Blood pressure 148/78, pulse 56, height 1.575 m (5' 2\"), weight 61.8 kg (136 lb 3.2 oz), SpO2 94 %.   Duloxetine, Pregabalin, Codeine, Lidocaine, Povidone-iodine, and Tramadol  Past Medical History:   Diagnosis Date    High blood pressure     HL (hearing loss)     Mastodynia 04/07/2015    Breast pain    Personal history of other diseases of the circulatory system     History of hypertension    Personal history of other diseases of the musculoskeletal system and connective tissue     History of fibromyositis    Personal history of other endocrine, nutritional and metabolic disease     History of obesity    Personal history of other mental and behavioral disorders     History of major depression    Personal history of other specified conditions 04/07/2015    History of fibrocystic disease of breast    Systemic lupus erythematosus, unspecified (CMS/HCC)     History of systemic lupus erythematosus (SLE)     Past Surgical History:   Procedure Laterality Date    BREAST BIOPSY  08/28/2013    Biopsy Breast Open    GALLBLADDER SURGERY  08/28/2013    Gallbladder Surgery    HYSTERECTOMY  08/28/2013    Hysterectomy    OTHER SURGICAL HISTORY  02/28/2019    Gallbladder surgery    OTHER SURGICAL HISTORY  06/22/2022    Bladder surgery     Family History   Problem Relation Name Age of Onset    Prostate cancer Father      Breast cancer Paternal Grandmother      Hypertension Other      Heart disease Other       @SOC    Current Outpatient Medications   Medication Sig Dispense Refill    acetaminophen (TylenoL) 325 mg tablet Take 2 tablets (650 mg) by mouth 4 times a day.      acyclovir (Zovirax) 400 mg tablet Take 1 tablet (400 mg) by mouth 2 times a day as needed.      amLODIPine (Norvasc) 2.5 mg tablet Take 1 tablet " (2.5 mg) by mouth once daily. 90 tablet 0    atorvastatin (Lipitor) 10 mg tablet Take 1 tablet (10 mg) by mouth once daily.      cephalexin (Keflex) 500 mg capsule Take 1 capsule (500 mg) by mouth 2 times a day for 7 days. 14 capsule 0    cholecalciferol (Vitamin D-3) 50 mcg (2,000 unit) capsule Take 1 capsule (50 mcg) by mouth once daily.      ciclopirox (Penlac) 8 % solution       coenzyme Q-10 100 mg capsule Take 1 capsule (100 mg) by mouth once daily.      estradiol (Estrace) 0.01 % (0.1 mg/gram) vaginal cream Insert into the vagina.      hydroxychloroquine (Plaquenil) 200 mg tablet Take 1 tablet (200 mg) by mouth once daily. 90 tablet 0    ibuprofen 600 mg tablet Take 1 tablet (600 mg) by mouth every 6 hours.      ipratropium (Atrovent) 21 mcg (0.03 %) nasal spray Administer 2 sprays into each nostril 3 times a day as needed for rhinitis (nasal drainage). 30 mL 12    meclizine HCl (MECLIZINE ORAL) Take by mouth.      metoprolol succinate XL (Toprol-XL) 25 mg 24 hr tablet Take 0.5 tablets (12.5 mg) by mouth once daily.      mirabegron (Myrbetriq) 25 mg tablet extended release 24 hr 24 hr tablet Take 1 tablet (25 mg) by mouth once daily. 100 tablet 2    naproxen (Naprosyn) 375 mg tablet Take 1 tablet (375 mg) by mouth early in the morning..      sertraline (Zoloft) 50 mg tablet Take 1 tablet (50 mg) by mouth once daily.      vitamin B complex (B Complex-Vitamin B12) tablet Take 1 tablet by mouth once daily.       No current facility-administered medications for this visit.        ROS  Review of Systems   All other systems reviewed and are negative.      Physical Exam  Physical Exam  Constitutional:       Appearance: Normal appearance.   HENT:      Head: Normocephalic and atraumatic.   Cardiovascular:      Rate and Rhythm: Normal rate and regular rhythm.      Heart sounds: S1 normal and S2 normal. Murmur heard.      Crescendo decrescendo systolic murmur is present with a grade of 2/6.   Pulmonary:      Effort:  Pulmonary effort is normal.      Breath sounds: Normal breath sounds.   Abdominal:      General: Abdomen is flat.   Musculoskeletal:      Right lower leg: No edema.      Left lower leg: No edema.   Skin:     General: Skin is warm and dry.   Neurological:      General: No focal deficit present.      Mental Status: She is alert and oriented to person, place, and time.   Psychiatric:         Mood and Affect: Mood normal.         Behavior: Behavior normal.          EKG  Encounter Date: 03/18/24   ECG 12 Lead    Narrative    Sinuis bradycardia at 55/mn,., poor R-wave prog. V1-5       Problem List Items Addressed This Visit       Benign essential hypertension - Primary    Relevant Orders    ECG 12 Lead (Completed)    GERD (gastroesophageal reflux disease)    Moderate aortic insufficiency     3/11/24 echocardiogram moderate aoritc insufficiency with lvef - 60-65% uncheanged from echocardiomgram of 8/28/23.         Pericardial cyst     Stable on CT chest of 3/5/24 at 2.6 decreased in size frpom  4.5 cm 2017              Return 6 months with EKG and echocardiogram a few days prior to return      Lamont Lane MD

## 2024-03-18 NOTE — ASSESSMENT & PLAN NOTE
3/11/24 echocardiogram moderate aoritc insufficiency with lvef - 60-65% uncheanged from echocardiomgram of 8/28/23.

## 2024-04-11 ENCOUNTER — TELEPHONE (OUTPATIENT)
Dept: UROLOGY | Facility: CLINIC | Age: 84
End: 2024-04-11
Payer: MEDICARE

## 2024-04-11 NOTE — TELEPHONE ENCOUNTER
Pt daughter and POARuthie left message asking if we can send a 90 day supply of the Myrbetriq to her [facility] mail order pharmacy instead of refilling it through [facility] again. Pt has follow up scheduled next month, thanks.

## 2024-04-12 DIAGNOSIS — N39.41 URGE INCONTINENCE: ICD-10-CM

## 2024-04-12 RX ORDER — MIRABEGRON 25 MG/1
25 TABLET, FILM COATED, EXTENDED RELEASE ORAL DAILY
Qty: 90 TABLET | Refills: 1 | Status: SHIPPED | OUTPATIENT
Start: 2024-04-12 | End: 2024-05-09 | Stop reason: ALTCHOICE

## 2024-05-06 ENCOUNTER — TELEPHONE (OUTPATIENT)
Dept: PRIMARY CARE | Facility: CLINIC | Age: 84
End: 2024-05-06
Payer: MEDICARE

## 2024-05-06 NOTE — TELEPHONE ENCOUNTER
Pt daughter left another message asking us to send back rx back to [facility] as her father states it is still too expensive to fill it through them. She apologizes for the misunderstanding. Please sign form on lala Foley.

## 2024-05-07 DIAGNOSIS — I10 PRIMARY HYPERTENSION: ICD-10-CM

## 2024-05-07 DIAGNOSIS — E78.00 HYPERCHOLESTEROLEMIA: Primary | ICD-10-CM

## 2024-05-07 RX ORDER — METOPROLOL SUCCINATE 25 MG/1
12.5 TABLET, EXTENDED RELEASE ORAL DAILY
Qty: 45 TABLET | Refills: 1 | Status: SHIPPED | OUTPATIENT
Start: 2024-05-07 | End: 2024-05-29 | Stop reason: SDUPTHER

## 2024-05-07 RX ORDER — AMLODIPINE BESYLATE 2.5 MG/1
2.5 TABLET ORAL DAILY
Qty: 90 TABLET | Refills: 1 | Status: SHIPPED | OUTPATIENT
Start: 2024-05-07 | End: 2024-05-29 | Stop reason: SDUPTHER

## 2024-05-07 RX ORDER — ATORVASTATIN CALCIUM 10 MG/1
10 TABLET, FILM COATED ORAL DAILY
Qty: 90 TABLET | Refills: 1 | Status: SHIPPED | OUTPATIENT
Start: 2024-05-07

## 2024-05-09 ENCOUNTER — OFFICE VISIT (OUTPATIENT)
Dept: UROLOGY | Facility: CLINIC | Age: 84
End: 2024-05-09
Payer: MEDICARE

## 2024-05-09 VITALS
HEART RATE: 58 BPM | BODY MASS INDEX: 25.76 KG/M2 | HEIGHT: 62 IN | WEIGHT: 140 LBS | SYSTOLIC BLOOD PRESSURE: 155 MMHG | DIASTOLIC BLOOD PRESSURE: 70 MMHG

## 2024-05-09 DIAGNOSIS — R35.1 NOCTURIA: ICD-10-CM

## 2024-05-09 DIAGNOSIS — N81.10 VAGINAL PROLAPSE: ICD-10-CM

## 2024-05-09 DIAGNOSIS — N95.2 VAGINAL ATROPHY: ICD-10-CM

## 2024-05-09 DIAGNOSIS — N39.41 URGE INCONTINENCE: Primary | ICD-10-CM

## 2024-05-09 PROCEDURE — 3077F SYST BP >= 140 MM HG: CPT | Performed by: NURSE PRACTITIONER

## 2024-05-09 PROCEDURE — 51798 US URINE CAPACITY MEASURE: CPT | Performed by: NURSE PRACTITIONER

## 2024-05-09 PROCEDURE — G2211 COMPLEX E/M VISIT ADD ON: HCPCS | Performed by: NURSE PRACTITIONER

## 2024-05-09 PROCEDURE — 1036F TOBACCO NON-USER: CPT | Performed by: NURSE PRACTITIONER

## 2024-05-09 PROCEDURE — 1160F RVW MEDS BY RX/DR IN RCRD: CPT | Performed by: NURSE PRACTITIONER

## 2024-05-09 PROCEDURE — 99213 OFFICE O/P EST LOW 20 MIN: CPT | Performed by: NURSE PRACTITIONER

## 2024-05-09 PROCEDURE — 1159F MED LIST DOCD IN RCRD: CPT | Performed by: NURSE PRACTITIONER

## 2024-05-09 PROCEDURE — 3078F DIAST BP <80 MM HG: CPT | Performed by: NURSE PRACTITIONER

## 2024-05-09 PROCEDURE — 1157F ADVNC CARE PLAN IN RCRD: CPT | Performed by: NURSE PRACTITIONER

## 2024-05-09 RX ORDER — MIRABEGRON 50 MG/1
50 TABLET, EXTENDED RELEASE ORAL DAILY
Qty: 28 TABLET | Refills: 0 | COMMUNITY
Start: 2024-05-09 | End: 2024-06-06

## 2024-05-09 RX ORDER — ESTRADIOL 0.1 MG/G
CREAM VAGINAL
Qty: 42.5 G | Refills: 3 | Status: SHIPPED | OUTPATIENT
Start: 2024-05-09

## 2024-05-09 RX ORDER — MIRABEGRON 50 MG/1
50 TABLET, EXTENDED RELEASE ORAL DAILY
Qty: 30 TABLET | Refills: 11 | Status: SHIPPED | OUTPATIENT
Start: 2024-05-09 | End: 2025-05-09

## 2024-05-09 NOTE — PATIENT INSTRUCTIONS
Increase Myrbetriq to 50 mg  Referral clinical pharmacy assistance [facility] med assistance  Estrogen cream  Follow up 6-8 weeks  Nurse line [phone]

## 2024-05-09 NOTE — PROGRESS NOTES
05/09/24   [id number]    Urge incontinence, prolapse, GSM, nocturia     Subjective      HPI Ludivina Erwin is a 84 y.o. female who presents for urge incontinence, prolapse, GSM, nocturia; out of Myrbetriq 25 mg due to changing pharmacy, but now w more urgency, once daily changing a pad before, leaking when she got to bathroom;     s/p sacrospinous ligament fixation, anterior repair, and perineoplasty 6/6/22 by Dr. Jaimes; no recurrence of prolapse, happy w surgery; no WESTON;     w memory issues had decided to stop the trospium and switched to Myrbetriq;       Imported from Dr. Jaimes last notes on 6/30/22 Last seen by myself 4/28/22. 82 year old female presents for postoperative evaluation s/p sacrospinous ligament fixation, anterior repair, and perineoplasty 6/6/22.      She had very minimal pain postoperatively and has not noticed any prolapse recurrence. Patient does note that her urinary flow has improved but is angulated forward a bit. She reports DTF every 1.5 hours and NTF x2. She wears a pad for very minor urinary incontinence. Patient feels that her urinary urgency has increased postoperatively. Her bowels have been well managed. Denies gross hematuria, dysuria, nocturia, flank pain, and bothersome frequency or urgency. Patient is a nonsmoker.         1. Anteroapical prolapse and rectocele  1a. POP-Q 5/6/21 with Dr. Shea: Aa: +2. C: -6 Gh: 5. Pb: 2 TVL: 8 Ap: -1. Bp: -1.  1b. POP-Q 3/10/22 : Aa: 3. Ba: 4 C: 0 Gh: 4.5. TVL: 8 Ap: 0. Bp: 0.   1c. Did not tolerate pessary well   1d. no occult WESTON seen on UDS 4/28/22   1e. s/p sacrospinous ligament fixation, anterior repair, and perineoplasty 6/6/22 C: -9 at end of procedure  2. Vaginal atrophy on estrogen cream  3. Urge dominant BACILIO - wears pads  3a. 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 82 year old female presents for postoperative evaluation s/p sacrospinous ligament  "fixation, anterior repair, and perineoplasty 6/6/22.      She had very minimal pain postoperatively and has not noticed any prolapse recurrence. Patient does note that her urinary flow has improved but is angulated forward a bit. She reports DTF every 1.5 hours and NTF x2. She wears a pad for very minor urinary incontinence. Patient feels that her urinary urgency has increased postoperatively. Her bowels have been well managed. Denies gross hematuria, dysuria, nocturia, flank pain, and bothersome frequency or urgency. Patient is a nonsmoker.         Objective     /70   Pulse 58   Ht 1.575 m (5' 2\")   Wt 63.5 kg (140 lb)   BMI 25.61 kg/m²    Physical Exam  General: Appears comfortable and in no apparent distress, well nourished  Head: Normocephalic, atraumatic  Neck: trachea midline  Respiratory: respirations unlabored, no wheezes, and no use of accessory muscles  Cardiovascular: at rest no dyspnea, well perfused  Skin: no visible rashes or lesions  Neurologic: grossly intact, oriented to person, place, and time  Psychiatric: mood and affect appropriate  Musculoskeletal: in chair for appt. no difficulty w upper body movement      Assessment/Plan   Problem List Items Addressed This Visit          Genitourinary and Reproductive    Vaginal prolapse    Vaginal atrophy    Nocturia    Urge incontinence - Primary     No orders of the defined types were placed in this encounter.     Increase Myrbetriq to 50 mg  Referral clinical pharmacy assistance Codi Pina med assistance  Estrogen cream  Follow up 6-8 weeks  Nurse line [phone]       ANTONIETA Lock-CNP  Lab Results   Component Value Date    GLUCOSE 92 05/15/2023    CALCIUM 9.6 05/15/2023     05/15/2023    K 3.9 05/15/2023    CO2 25 05/15/2023     05/15/2023    BUN 12 05/15/2023    CREATININE 0.56 05/15/2023       "

## 2024-05-10 ENCOUNTER — TELEPHONE (OUTPATIENT)
Dept: UROLOGY | Facility: CLINIC | Age: 84
End: 2024-05-10
Payer: MEDICARE

## 2024-05-10 NOTE — TELEPHONE ENCOUNTER
Please be advised that Prior Authorization for Myrbetriq 50mg has been approved. Sending pt my chart message to inform her, thanks.    YOUR REQUEST HAS BEEN APPROVED  PA Case: [id number], Status: Approved, Coverage Starts on: 2/9/2024 12:00:00 AM, Coverage Ends on: 5/10/2025 12:00:00 AM.

## 2024-05-10 NOTE — TELEPHONE ENCOUNTER
Couldn't get the my chart message to go through but I left a message on her daughter and Ruthie MUNOZ's VM.

## 2024-05-22 ENCOUNTER — CLINICAL SUPPORT (OUTPATIENT)
Dept: AUDIOLOGY | Facility: CLINIC | Age: 84
End: 2024-05-22
Payer: MEDICARE

## 2024-05-22 DIAGNOSIS — H90.3 SENSORINEURAL HEARING LOSS (SNHL), BILATERAL: ICD-10-CM

## 2024-05-22 DIAGNOSIS — H93.13 TINNITUS, BILATERAL: Primary | ICD-10-CM

## 2024-05-22 PROCEDURE — 92557 COMPREHENSIVE HEARING TEST: CPT | Performed by: AUDIOLOGIST

## 2024-05-22 PROCEDURE — 92550 TYMPANOMETRY & REFLEX THRESH: CPT | Performed by: AUDIOLOGIST

## 2024-05-22 NOTE — PROGRESS NOTES
"AUDIOLOGY ADULT AUDIOMETRIC EVALUATION      Name:  Ludivina Erwin  :  1940  Age:  84 y.o.  Date of Evaluation:  2024    HISTORY  Reason for visit:  heairng loss  Ms. Erwin is seen 24 at the request of Gopi Millan M.D.  for an evaluation of hearing.      Chief complaint:    - hearing loss    Hearing loss:  2-3 years; right worse than left  Tinnitus:   constant tinnitus, localizes to \"head;\" bothersome  Otitis Media: denies  Otologic surgical history:  denies  Dizziness/imbalance:  off balance/\"shaky\"  Otalgia:  denies   Ear pressure/fullness:  denies  History of excessive noise exposure:  some music  Other: none    Hearing aid history: bilateral hearing aids; hates them; couple years old; does not perceive benefit          EVALUATION  Please find audiogram in \"Media\" tab (Document Type:  Audiology Report) or included at the bottom of this note.    RESULTS   Otoscopic Evaluation: non-occlusive cerumen bilaterally      Immittance Measures (226 Hz probe tone):   Tympanometry is consistent with normal middle ear pressure and normal tympanic membrane mobility bilaterally.    Note wide left trace.      Ipsilateral acoustic reflexes were absent for 500-4000 Hz bilaterally.       Test technique:  standard behavioral technique via insert earphones.  Reliability is good.    Pure Tone Audiometry: Hearing sensitivity is in the mild to moderately-severe hearing loss range bilaterally.     Speech Audiometry:        Right Ear:  Speech Reception Threshold (SRT) was obtained at 40 dBHL                 Speech discrimination score was 100% in quiet when words were presented at 90 dBHL      Left Ear:  Speech Reception Threshold (SRT) was obtained at 50 dBHL                 Speech discrimination score was 92% in quiet when words were presented at 90 dBHL    IMPRESSIONS:  In comparison with previous audiogram (from another facility) dated 10/8/2021, hearing is essentially stable bilaterally.       Patient is " expected to experience communication difficulty in many situations.    Patient is expected to benefit from devices that provide amplification (e.g., hearing aids) and improve the desired sound signal over that of background noise.       RECOMMENDATIONS  Hearing Aid Evaluation with an audiologist to discuss hearing technology (such as hearing aids) and services.  Reassess hearing in 1 year (or sooner if medically indicated or if there is a concern for a change in hearing).    Continue with medical follow-up as indicated.      PATIENT EDUCATION  Discussed results and recommendations with patient.  Questions were addressed and the patient was encouraged to contact our department should concerns arise.       JONY Bond, CCC-A  Licensed Audiologist

## 2024-05-29 ENCOUNTER — TELEPHONE (OUTPATIENT)
Dept: OBSTETRICS AND GYNECOLOGY | Facility: CLINIC | Age: 84
End: 2024-05-29
Payer: MEDICARE

## 2024-05-29 DIAGNOSIS — I10 PRIMARY HYPERTENSION: ICD-10-CM

## 2024-05-29 DIAGNOSIS — M06.00 SERONEGATIVE RHEUMATOID ARTHRITIS (MULTI): ICD-10-CM

## 2024-05-29 RX ORDER — METOPROLOL SUCCINATE 25 MG/1
12.5 TABLET, EXTENDED RELEASE ORAL DAILY
Qty: 45 TABLET | Refills: 1 | Status: SHIPPED | OUTPATIENT
Start: 2024-05-29

## 2024-05-29 RX ORDER — HYDROXYCHLOROQUINE SULFATE 200 MG/1
200 TABLET, FILM COATED ORAL DAILY
Qty: 90 TABLET | Refills: 0 | Status: SHIPPED | OUTPATIENT
Start: 2024-05-29

## 2024-05-29 RX ORDER — AMLODIPINE BESYLATE 2.5 MG/1
2.5 TABLET ORAL DAILY
Qty: 90 TABLET | Refills: 1 | Status: SHIPPED | OUTPATIENT
Start: 2024-05-29

## 2024-05-29 NOTE — TELEPHONE ENCOUNTER
Prescription refill request    Metoprolol  Amlodipine  Hydroxychloroquine    Patient out of medication  [facility]

## 2024-06-10 ENCOUNTER — APPOINTMENT (OUTPATIENT)
Dept: RHEUMATOLOGY | Facility: CLINIC | Age: 84
End: 2024-06-10
Payer: MEDICARE

## 2024-07-18 ENCOUNTER — APPOINTMENT (OUTPATIENT)
Dept: UROLOGY | Facility: CLINIC | Age: 84
End: 2024-07-18
Payer: MEDICARE

## 2024-07-22 ENCOUNTER — APPOINTMENT (OUTPATIENT)
Dept: OTOLARYNGOLOGY | Facility: CLINIC | Age: 84
End: 2024-07-22
Payer: MEDICARE

## 2024-07-25 ENCOUNTER — APPOINTMENT (OUTPATIENT)
Dept: OTOLARYNGOLOGY | Facility: CLINIC | Age: 84
End: 2024-07-25
Payer: MEDICARE

## 2024-08-12 ENCOUNTER — TELEPHONE (OUTPATIENT)
Dept: PRIMARY CARE | Facility: CLINIC | Age: 84
End: 2024-08-12
Payer: MEDICARE

## 2024-08-12 NOTE — TELEPHONE ENCOUNTER
Prescription refill request    Amlodipine  Metoprolol  Hydroxychloroquine    Patient out of medication    [facility] mail

## 2024-08-13 ENCOUNTER — APPOINTMENT (OUTPATIENT)
Dept: PHARMACY | Facility: HOSPITAL | Age: 84
End: 2024-08-13
Payer: MEDICARE

## 2024-08-13 DIAGNOSIS — I10 PRIMARY HYPERTENSION: ICD-10-CM

## 2024-08-13 DIAGNOSIS — N39.41 URGE INCONTINENCE: ICD-10-CM

## 2024-08-13 DIAGNOSIS — R35.1 NOCTURIA: ICD-10-CM

## 2024-08-13 DIAGNOSIS — M06.00 SERONEGATIVE RHEUMATOID ARTHRITIS (MULTI): ICD-10-CM

## 2024-08-13 DIAGNOSIS — N95.2 VAGINAL ATROPHY: ICD-10-CM

## 2024-08-13 RX ORDER — AMLODIPINE BESYLATE 2.5 MG/1
2.5 TABLET ORAL DAILY
Qty: 90 TABLET | Refills: 1 | Status: SHIPPED | OUTPATIENT
Start: 2024-08-13

## 2024-08-13 RX ORDER — HYDROXYCHLOROQUINE SULFATE 200 MG/1
200 TABLET, FILM COATED ORAL DAILY
Qty: 90 TABLET | Refills: 0 | Status: SHIPPED | OUTPATIENT
Start: 2024-08-13

## 2024-08-13 RX ORDER — METOPROLOL SUCCINATE 25 MG/1
12.5 TABLET, EXTENDED RELEASE ORAL DAILY
Qty: 45 TABLET | Refills: 1 | Status: SHIPPED | OUTPATIENT
Start: 2024-08-13

## 2024-08-13 RX ORDER — MIRABEGRON 50 MG/1
50 TABLET, EXTENDED RELEASE ORAL DAILY
Qty: 90 TABLET | Refills: 3 | Status: SHIPPED | OUTPATIENT
Start: 2024-08-13 | End: 2025-08-13

## 2024-08-13 RX ORDER — ESTRADIOL 0.1 MG/G
CREAM VAGINAL
Qty: 42.5 G | Refills: 3 | Status: SHIPPED | OUTPATIENT
Start: 2024-08-13

## 2024-08-13 NOTE — PROGRESS NOTES
"  Patient ID: Ludivina Erwin is a 84 y.o. female who presents for No chief complaint on file..    Referring Provider: Linda Lane  Pt was referred for cost assistance    Preferred Pharmacy:    [facility] - [place], [place] - [address]  [address]  [address]  [facility] [address]  Phone: [phone] Fax: [phone]    [facility] - [place], [place] - [address]  [address]  [facility] [address]  Phone: [phone] Fax: [phone]    [id number] - [facility] [place] - [address]  [address]  [facility] [address]  Phone: [phone] Fax: [phone]      Copay assistance:   Do you have copays on your medications? Yes  Do you have trouble affording your current medications? Yes     Patient Assistance Screening (VAF)    Patient verbally reports monthly or yearly income which is less than 400% federal poverty level   Application for program has been submitted for the following medications:   Estrace vaginal cream  Myrbetriq  Patient has been informed that program team will be reaching out to them to discuss necessary documentation, instructed to answer phone/return voicemail.   Patient aware this process may take up to 6 weeks.   If approved medication must be filled through [facility] pharmacy and may be picked up or mailed to patient.       Subjective      Vaginal Symptoms  Vaginal Dryness: Yes  Recurrent urinary tract infections: Not recent  No results found for: \"ESTRADIOLFRE\", \"PROGESTERONE\", \"ESTRADIOL\", \"FSH\"    Current Treatment:   Estrace vaginal cream applied twice weekly at bedtime    Urinary Symptoms  Medications that may contribute to symptoms:   diuretics, anticholinergics, alpha blockers (doxazosin, prazosin, terazosin), tricyclic antidepressants, antipsychotics (lithium, clozapine), calcium channel blockers (causes bladder to relax and affects ability to empty properly), opioids (impair bladder contraction), antihistamines (diphenhydramine, chlorpheniramine), " "pseudoephedrine, phenylephrine, SGLT2 inhibitors (increases the amount of glucose and fluid excreted through kidneys).   Any history of:   Narrow-angle glaucoma? No  Impaired gastric emptying? No  Urinary retention? Unsure  If yes to any of the above use caution/avoid antimuscarinic medications    If diabetes, blood sugar controlled? No  Frequently of bowel movement? Daily  Tobacco use? No  How many protective undergarments are you utilizing daily? All the time, even with myrbetriq changing twice daily. Wears pads/special pants at night time, rarely waking up wet.      What medications have been tried/stopped?   Trospium - stopped due to concern for memory issues  Current medication? Myrbetriq  When started? About 1 year  Side effects? None  Improvement in symptoms? Yes, with increase to 50mg was helpful.       Cardiovascular Health  The ASCVD Risk score (Imani AGUILAR, et al., 2019) failed to calculate for the following reasons:    The 2019 ASCVD risk score is only valid for ages 40 to 79    Lab Results   Component Value Date    CHOL 177 12/09/2022     Lab Results   Component Value Date    HDL 80.4 12/09/2022     No results found for: \"LDLCALC\"  Lab Results   Component Value Date    TRIG 75 12/09/2022     No components found for: \"CHOLHDL\"        Blood Sugar Balance  Lab Results   Component Value Date    GLUCOSE 92 05/15/2023     No results found for: \"LEPTIN\", \"INSULFAST\", \"GLUF\"      Thyroid  Lab Results   Component Value Date    TSH 2.17 05/15/2023    THYROIDPAB <28 05/15/2023       Iron Status  No results found for: \"IRON\", \"TIBC\", \"FERRITIN\"     Kidney Function  Lab Results   Component Value Date    GFRF 90 05/15/2023    CREATININE 0.56 05/15/2023       Potassium  Lab Results   Component Value Date    K 3.9 05/15/2023        Vitamin D3  No results found for: \"VITD25\"    Current Outpatient Medications on File Prior to Visit   Medication Sig Dispense Refill    acetaminophen (TylenoL) 325 mg tablet Take 2 tablets " (650 mg) by mouth 4 times a day.      acyclovir (Zovirax) 400 mg tablet Take 1 tablet (400 mg) by mouth 2 times a day as needed.      atorvastatin (Lipitor) 10 mg tablet Take 1 tablet (10 mg) by mouth once daily. 90 tablet 1    cholecalciferol (Vitamin D-3) 50 mcg (2,000 unit) capsule Take 1 capsule (50 mcg) by mouth once daily.      ciclopirox (Penlac) 8 % solution       coenzyme Q-10 100 mg capsule Take 1 capsule (100 mg) by mouth once daily.      estradiol (Estrace) 0.01 % (0.1 mg/gram) vaginal cream 1 gram pea size amount 2-3 x per week 42.5 g 3    ibuprofen 600 mg tablet Take 1 tablet (600 mg) by mouth every 6 hours.      ipratropium (Atrovent) 21 mcg (0.03 %) nasal spray Administer 2 sprays into each nostril 3 times a day as needed for rhinitis (nasal drainage). 30 mL 12    meclizine HCl (MECLIZINE ORAL) Take by mouth.      mirabegron (Myrbetriq) 50 mg tablet extended release 24 hr 24 hr tablet Take 1 tablet (50 mg) by mouth once daily. 30 tablet 11    naproxen (Naprosyn) 375 mg tablet Take 1 tablet (375 mg) by mouth early in the morning..      sertraline (Zoloft) 50 mg tablet Take 1 tablet (50 mg) by mouth once daily.      vitamin B complex (B Complex-Vitamin B12) tablet Take 1 tablet by mouth once daily.      [DISCONTINUED] amLODIPine (Norvasc) 2.5 mg tablet Take 1 tablet (2.5 mg) by mouth once daily. 90 tablet 1    [DISCONTINUED] hydroxychloroquine (Plaquenil) 200 mg tablet Take 1 tablet (200 mg) by mouth once daily. 90 tablet 0    [DISCONTINUED] metoprolol succinate XL (Toprol-XL) 25 mg 24 hr tablet Take 0.5 tablets (12.5 mg) by mouth once daily. 45 tablet 1     No current facility-administered medications on file prior to visit.        Medication and allergy reconciliation completed     Drug Interactions   Myrbetriq:hydroxychloroquine: EKG 3/18/24 QT interval 422/QTc 403    Assessment/Plan     Patient is experiencing urinary frequency, urgency, and incontinence. Currently taking Myrbetriq x 1 year.  Improvement in symptoms but still having incontinence about twice daily. No side effects.   Spoke with patients  Joe ([phone]).    Has tried before: trospium  Patient plans to apply for [facility], pending 1040 via email or fax      Myrbetriq  Discussed MOA: activates beta-3 adrenergic receptors in the bladder resulting in relaxation of the detrusor smooth muscle during the urine storage phase, thus increasing bladder capacity.  Provided education on administration (swallow whole with water; do not chew, divide, or crush) and potential side effects including but not limited to headache, nose or throat irritation, dry mouth, and constipation. Any signs or symptoms of angioedema- immediately discontinue use and seek immediate medical attention.   Monitor blood pressure and heart rate. May notice a slight increase. Please call me if blood pressure becomes >120/80 mmHg or pulse significantly elevates from baseline.   BP Readings from Last 3 Encounters:   05/09/24 155/70   03/18/24 148/78   03/11/24 142/76     Caution with other meds that prolong QT interval- many drug:drug interactions   Patient is in agreement that they will notify me if starting any new medications  Efficacy is seen within 8 weeks; steady state achieved within 7 days.      LABS  Lab Results   Component Value Date    GFRF 90 05/15/2023         CONTINUE  Myrbetriq 50 mg once daily   Estrace vaginal cream, applied vaginally twice weekly    Follow-up: 9/24/24 11am     Time spent with pt: Total length of time 40 (minutes) of the encounter and more than 50% was spent counseling the patient.      Codi Pina, Pharm.D, [facility], [facility]  Clinical Pharmacist  Pharmacy Services  [phone]    Continue all meds under the continuation of care with the referring provider and clinical pharmacy team.    Verbal consent to manage patient's drug therapy was obtained from the patient and/or an individual authorized to act on behalf of a patient. They were  informed they may decline to participate or withdraw from participation in pharmacy services at any time.

## 2024-08-30 PROCEDURE — RXMED WILLOW AMBULATORY MEDICATION CHARGE

## 2024-09-05 ENCOUNTER — PHARMACY VISIT (OUTPATIENT)
Dept: PHARMACY | Facility: CLINIC | Age: 84
End: 2024-09-05
Payer: MEDICARE

## 2024-09-16 ENCOUNTER — HOSPITAL ENCOUNTER (OUTPATIENT)
Dept: CARDIOLOGY | Facility: CLINIC | Age: 84
Discharge: HOME | End: 2024-09-16
Payer: MEDICARE

## 2024-09-16 DIAGNOSIS — I35.1 MODERATE AORTIC INSUFFICIENCY: ICD-10-CM

## 2024-09-16 LAB
AORTIC VALVE MEAN GRADIENT: 6.1 MMHG
AORTIC VALVE PEAK VELOCITY: 1.86 M/S
AV PEAK GRADIENT: 13.8 MMHG
AVA (PEAK VEL): 2.46 CM2
AVA (VTI): 2.48 CM2
EJECTION FRACTION APICAL 4 CHAMBER: 58.8
EJECTION FRACTION: 58 %
LEFT ATRIUM VOLUME AREA LENGTH INDEX BSA: 30.4 ML/M2
LEFT VENTRICLE INTERNAL DIMENSION DIASTOLE: 4.76 CM (ref 3.5–6)
LEFT VENTRICULAR OUTFLOW TRACT DIAMETER: 2.08 CM
RIGHT VENTRICLE FREE WALL PEAK S': 0.18 CM/S
RIGHT VENTRICLE PEAK SYSTOLIC PRESSURE: 23.1 MMHG
TRICUSPID ANNULAR PLANE SYSTOLIC EXCURSION: 1.8 CM

## 2024-09-16 PROCEDURE — 93306 TTE W/DOPPLER COMPLETE: CPT | Performed by: INTERNAL MEDICINE

## 2024-09-16 PROCEDURE — 93306 TTE W/DOPPLER COMPLETE: CPT

## 2024-09-23 ENCOUNTER — APPOINTMENT (OUTPATIENT)
Dept: CARDIOLOGY | Facility: CLINIC | Age: 84
End: 2024-09-23
Payer: MEDICARE

## 2024-09-23 VITALS
OXYGEN SATURATION: 96 % | WEIGHT: 138 LBS | SYSTOLIC BLOOD PRESSURE: 118 MMHG | HEART RATE: 57 BPM | BODY MASS INDEX: 25.4 KG/M2 | HEIGHT: 62 IN | DIASTOLIC BLOOD PRESSURE: 64 MMHG

## 2024-09-23 DIAGNOSIS — I35.1 MODERATE AORTIC INSUFFICIENCY: Primary | ICD-10-CM

## 2024-09-23 DIAGNOSIS — K21.9 GASTROESOPHAGEAL REFLUX DISEASE WITHOUT ESOPHAGITIS: ICD-10-CM

## 2024-09-23 DIAGNOSIS — Q24.8 PERICARDIAL CYST (HHS-HCC): ICD-10-CM

## 2024-09-23 PROCEDURE — 99214 OFFICE O/P EST MOD 30 MIN: CPT | Performed by: INTERNAL MEDICINE

## 2024-09-23 PROCEDURE — 93005 ELECTROCARDIOGRAM TRACING: CPT | Performed by: INTERNAL MEDICINE

## 2024-09-23 PROCEDURE — 1036F TOBACCO NON-USER: CPT | Performed by: INTERNAL MEDICINE

## 2024-09-23 PROCEDURE — 1159F MED LIST DOCD IN RCRD: CPT | Performed by: INTERNAL MEDICINE

## 2024-09-23 PROCEDURE — 1157F ADVNC CARE PLAN IN RCRD: CPT | Performed by: INTERNAL MEDICINE

## 2024-09-23 PROCEDURE — 3074F SYST BP LT 130 MM HG: CPT | Performed by: INTERNAL MEDICINE

## 2024-09-23 PROCEDURE — 1160F RVW MEDS BY RX/DR IN RCRD: CPT | Performed by: INTERNAL MEDICINE

## 2024-09-23 PROCEDURE — 93010 ELECTROCARDIOGRAM REPORT: CPT | Performed by: INTERNAL MEDICINE

## 2024-09-23 PROCEDURE — 3078F DIAST BP <80 MM HG: CPT | Performed by: INTERNAL MEDICINE

## 2024-09-23 NOTE — PROGRESS NOTES
"  Patient ID: Ludivina Erwin is a 84 y.o. female who presents for No chief complaint on file..    Referring Provider: Linda Lane  Pt was referred for cost assistance    Preferred Pharmacy:    [facility] - [place], [place] - [address]  [address]  [address]  [facility] [address]  Phone: [phone] Fax: [phone]    [facility] - [place], [place] - [address]  [address]  [facility] [address]  Phone: [phone] Fax: [phone]    [id number] - [place], [place] - [address]  [address]  [facility] [address]  Phone: [phone] Fax: [phone]    [facility]  [address], [address]  [facility] [address]  Phone: [phone] Fax: [phone]      Copay assistance:   Do you have copays on your medications? Yes  Do you have trouble affording your current medications? Yes     Patient Assistance Screening (VAF)    Patient verbally reports monthly or yearly income which is less than 400% federal poverty level   Application for program has been submitted for the following medications:   Estrace vaginal cream  Myrbetriq  Patient has been informed that program team will be reaching out to them to discuss necessary documentation, instructed to answer phone/return voicemail.   Patient aware this process may take up to 6 weeks.   If approved medication must be filled through [facility] pharmacy and may be picked up or mailed to patient.       Subjective      Vaginal Symptoms  Vaginal Dryness: Yes  Recurrent urinary tract infections: Not recent  No results found for: \"ESTRADIOLFRE\", \"PROGESTERONE\", \"ESTRADIOL\", \"FSH\"    Current Treatment:   Estrace vaginal cream applied twice weekly at bedtime    Urinary Symptoms  Medications that may contribute to symptoms:   diuretics, anticholinergics, alpha blockers (doxazosin, prazosin, terazosin), tricyclic antidepressants, antipsychotics (lithium, clozapine), calcium channel blockers (causes bladder to relax and affects " "ability to empty properly), opioids (impair bladder contraction), antihistamines (diphenhydramine, chlorpheniramine), pseudoephedrine, phenylephrine, SGLT2 inhibitors (increases the amount of glucose and fluid excreted through kidneys).   Any history of:   Narrow-angle glaucoma? No  Impaired gastric emptying? No  Urinary retention? Unsure  If yes to any of the above use caution/avoid antimuscarinic medications    If diabetes, blood sugar controlled? No  Frequently of bowel movement? Daily  Tobacco use? No  How many protective undergarments are you utilizing daily? All the time, even with myrbetriq changing twice daily. Wears pads/special pants at night time, rarely waking up wet.      What medications have been tried/stopped?   Trospium - stopped due to concern for memory issues  Current medication? Myrbetriq  When started? About 1 year  Side effects? None  Improvement in symptoms? Yes, with increase to 50mg was helpful.       Cardiovascular Health  The ASCVD Risk score (Imani AGUILAR, et al., 2019) failed to calculate for the following reasons:    The 2019 ASCVD risk score is only valid for ages 40 to 79    Lab Results   Component Value Date    CHOL 177 12/09/2022     Lab Results   Component Value Date    HDL 80.4 12/09/2022     No results found for: \"LDLCALC\"  Lab Results   Component Value Date    TRIG 75 12/09/2022     No components found for: \"CHOLHDL\"        Blood Sugar Balance  Lab Results   Component Value Date    GLUCOSE 92 05/15/2023     No results found for: \"LEPTIN\", \"INSULFAST\", \"GLUF\"      Thyroid  Lab Results   Component Value Date    TSH 2.17 05/15/2023    THYROIDPAB <28 05/15/2023       Iron Status  No results found for: \"IRON\", \"TIBC\", \"FERRITIN\"     Kidney Function  Lab Results   Component Value Date    GFRF 90 05/15/2023    CREATININE 0.56 05/15/2023       Potassium  Lab Results   Component Value Date    K 3.9 05/15/2023        Vitamin D3  No results found for: \"VITD25\"    Current Outpatient " Medications on File Prior to Visit   Medication Sig Dispense Refill    acetaminophen (TylenoL) 325 mg tablet Take 2 tablets (650 mg) by mouth 4 times a day.      acyclovir (Zovirax) 400 mg tablet Take 1 tablet (400 mg) by mouth 2 times a day as needed.      amLODIPine (Norvasc) 2.5 mg tablet Take 1 tablet (2.5 mg) by mouth once daily. 90 tablet 1    atorvastatin (Lipitor) 10 mg tablet Take 1 tablet (10 mg) by mouth once daily. 90 tablet 1    cholecalciferol (Vitamin D-3) 50 mcg (2,000 unit) capsule Take 1 capsule (50 mcg) by mouth once daily.      ciclopirox (Penlac) 8 % solution       coenzyme Q-10 100 mg capsule Take 1 capsule (100 mg) by mouth once daily.      estradiol (Estrace) 0.01 % (0.1 mg/gram) vaginal cream 1 gram pea size amount 2-3 x per week 42.5 g 3    hydroxychloroquine (Plaquenil) 200 mg tablet Take 1 tablet (200 mg) by mouth once daily. 90 tablet 0    ibuprofen 600 mg tablet Take 1 tablet (600 mg) by mouth every 6 hours.      ipratropium (Atrovent) 21 mcg (0.03 %) nasal spray Administer 2 sprays into each nostril 3 times a day as needed for rhinitis (nasal drainage). 30 mL 12    meclizine HCl (MECLIZINE ORAL) Take by mouth.      metoprolol succinate XL (Toprol-XL) 25 mg 24 hr tablet Take 0.5 tablets (12.5 mg) by mouth once daily. 45 tablet 1    mirabegron (Myrbetriq) 50 mg tablet extended release 24 hr 24 hr tablet Take 1 tablet (50 mg) by mouth once daily. 90 tablet 3    naproxen (Naprosyn) 375 mg tablet Take 1 tablet (375 mg) by mouth early in the morning..      sertraline (Zoloft) 50 mg tablet Take 1 tablet (50 mg) by mouth once daily.      vitamin B complex (B Complex-Vitamin B12) tablet Take 1 tablet by mouth once daily.       No current facility-administered medications on file prior to visit.        Medication and allergy reconciliation completed     Drug Interactions   Myrbetriq:hydroxychloroquine: EKG 3/18/24 QT interval 422/QTc 403    Assessment/Plan     Patient was experiencing urinary  "frequency, urgency, and incontinence. Currently taking Myrbetriq x 1 year. Symptoms remain the same since the last appointment, still experiencing occasional incontinence (\"a couple times a week\"). Next follow up with Linda is October 24th.  Spoke with patients  Joe ([phone]).    No side effects  Has tried before: trospium  Patient approved for [facility] from 8/29/24-8/29/25  Her  is curious if she could try a different medication. I will reach out to Linda regarding this.       LABS  Lab Results   Component Value Date    GFRF 90 05/15/2023         CONTINUE  Myrbetriq 50 mg once daily   Estrace vaginal cream, applied vaginally twice weekly    Follow-up: 1/8/25 9:30am     Time spent with pt: Total length of time 30 (minutes) of the encounter and more than 50% was spent counseling the patient.      Codi Pina, Pharm.D, [facility], [facility]  Clinical Pharmacist  Pharmacy Services  [phone]    Continue all meds under the continuation of care with the referring provider and clinical pharmacy team.    Verbal consent to manage patient's drug therapy was obtained from the patient and/or an individual authorized to act on behalf of a patient. They were informed they may decline to participate or withdraw from participation in pharmacy services at any time.  "

## 2024-09-23 NOTE — ASSESSMENT & PLAN NOTE
Stable on CT chest of 3/5/24 decreased in sized from  4.5 cm 2017              [address]   5 Servings of fruit and vegetables each day   2 Hour limit of screen time   1 Hour of physical activity each day   0 Sugary drinks    c

## 2024-09-23 NOTE — PROGRESS NOTES
"  Subjective  Ludivina Erwin  is a 84 y.o. year old female who presents for aortic insufficiency, pericardia cyst.  No chest pain, no dyspna, no palpitation, no dyspnea    Blood pressure 118/64, pulse 57, height 1.575 m (5' 2\"), weight 62.6 kg (138 lb), SpO2 96%.   Duloxetine, Pregabalin, Povidone-iodine, and Tramadol  Past Medical History:   Diagnosis Date    High blood pressure     HL (hearing loss)     Mastodynia 04/07/2015    Breast pain    Personal history of other diseases of the circulatory system     History of hypertension    Personal history of other diseases of the musculoskeletal system and connective tissue     History of fibromyositis    Personal history of other endocrine, nutritional and metabolic disease     History of obesity    Personal history of other mental and behavioral disorders     History of major depression    Personal history of other specified conditions 04/07/2015    History of fibrocystic disease of breast    Systemic lupus erythematosus, unspecified (Multi)     History of systemic lupus erythematosus (SLE)     Past Surgical History:   Procedure Laterality Date    BREAST BIOPSY  08/28/2013    Biopsy Breast Open    GALLBLADDER SURGERY  08/28/2013    Gallbladder Surgery    HYSTERECTOMY  08/28/2013    Hysterectomy    OTHER SURGICAL HISTORY  02/28/2019    Gallbladder surgery    OTHER SURGICAL HISTORY  06/22/2022    Bladder surgery     Family History   Problem Relation Name Age of Onset    Prostate cancer Father      Breast cancer Paternal Grandmother      Hypertension Other      Heart disease Other       @SOC    Current Outpatient Medications   Medication Sig Dispense Refill    acetaminophen (TylenoL) 325 mg tablet Take 2 tablets (650 mg) by mouth 4 times a day.      acyclovir (Zovirax) 400 mg tablet Take 1 tablet (400 mg) by mouth 2 times a day as needed.      amLODIPine (Norvasc) 2.5 mg tablet Take 1 tablet (2.5 mg) by mouth once daily. 90 tablet 1    atorvastatin (Lipitor) 10 mg " tablet Take 1 tablet (10 mg) by mouth once daily. 90 tablet 1    cholecalciferol (Vitamin D-3) 50 mcg (2,000 unit) capsule Take 1 capsule (50 mcg) by mouth once daily.      ciclopirox (Penlac) 8 % solution       coenzyme Q-10 100 mg capsule Take 1 capsule (100 mg) by mouth once daily.      estradiol (Estrace) 0.01 % (0.1 mg/gram) vaginal cream 1 gram pea size amount 2-3 x per week 42.5 g 3    hydroxychloroquine (Plaquenil) 200 mg tablet Take 1 tablet (200 mg) by mouth once daily. 90 tablet 0    ibuprofen 600 mg tablet Take 1 tablet (600 mg) by mouth every 6 hours.      ipratropium (Atrovent) 21 mcg (0.03 %) nasal spray Administer 2 sprays into each nostril 3 times a day as needed for rhinitis (nasal drainage). 30 mL 12    meclizine HCl (MECLIZINE ORAL) Take by mouth.      metoprolol succinate XL (Toprol-XL) 25 mg 24 hr tablet Take 0.5 tablets (12.5 mg) by mouth once daily. 45 tablet 1    mirabegron (Myrbetriq) 50 mg tablet extended release 24 hr 24 hr tablet Take 1 tablet (50 mg) by mouth once daily. 90 tablet 3    naproxen (Naprosyn) 375 mg tablet Take 1 tablet (375 mg) by mouth early in the morning..      sertraline (Zoloft) 50 mg tablet Take 1 tablet (50 mg) by mouth once daily.      vitamin B complex (B Complex-Vitamin B12) tablet Take 1 tablet by mouth once daily.       No current facility-administered medications for this visit.        ROS  Review of Systems   All other systems reviewed and are negative.      Physical Exam  Physical Exam  HENT:      Head: Normocephalic and atraumatic.   Cardiovascular:      Rate and Rhythm: Bradycardia present.      Heart sounds: Murmur heard.      Crescendo systolic murmur is present with a grade of 1/6.   Abdominal:      General: Abdomen is flat.   Musculoskeletal:      Right lower leg: No edema.      Left lower leg: No edema.   Skin:     General: Skin is warm and dry.   Neurological:      General: No focal deficit present.      Mental Status: She is alert and oriented to  person, place, and time.   Psychiatric:         Mood and Affect: Mood normal.         Behavior: Behavior normal.          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

## 2024-09-24 ENCOUNTER — APPOINTMENT (OUTPATIENT)
Dept: PHARMACY | Facility: HOSPITAL | Age: 84
End: 2024-09-24
Payer: MEDICARE

## 2024-09-24 ENCOUNTER — TELEPHONE (OUTPATIENT)
Dept: PHARMACY | Facility: HOSPITAL | Age: 84
End: 2024-09-24

## 2024-09-24 DIAGNOSIS — R35.1 NOCTURIA: ICD-10-CM

## 2024-09-24 DIAGNOSIS — N39.41 URGE INCONTINENCE: Primary | ICD-10-CM

## 2024-09-24 DIAGNOSIS — N95.2 VAGINAL ATROPHY: ICD-10-CM

## 2024-09-24 PROCEDURE — RXMED WILLOW AMBULATORY MEDICATION CHARGE

## 2024-09-24 RX ORDER — VIBEGRON 75 MG/1
75 TABLET, FILM COATED ORAL DAILY
Qty: 90 TABLET | Refills: 3 | Status: SHIPPED | OUTPATIENT
Start: 2024-09-24

## 2024-09-24 NOTE — TELEPHONE ENCOUNTER
Discussed with Linda changing Myrbetriq to Gemtesa. Patient has continued incontinence a few times weekly.       Gemtesa   Discussed MOA: works by activating beta-3 adrenergic receptors in the bladder resulting in relaxation of the detrusor smooth muscle during the urine storage phase, thus increasing bladder capacity.  Provided education on administration and potential side effects including but not limited to hot flashes <2%, constipation/diarrhea <2%, dry mouth <2%, and headache <4%.   Gemtesa (vibegron) starts working almost immediately - within a few days of first taking it, with noticeable improvements in urinary urgency, frequency, and incontinence noted in clinical trials at 2 weeks which were reported as significant by 12 weeks.      LABS  Lab Results   Component Value Date    GFRF 90 05/15/2023       DISCONTINUE  Myrbetriq 50 mg once daily     START  Gemtessa 75 mg once daily

## 2024-09-24 NOTE — Clinical Note
Good morning, Mrs. Erwin continues to experience incontinence with Myrbetriq at 50mg. Would you be ok with it if I switched her to Gemtesa to see if we could improve her symptoms further?   GalolyCodi

## 2024-09-25 ENCOUNTER — PHARMACY VISIT (OUTPATIENT)
Dept: PHARMACY | Facility: CLINIC | Age: 84
End: 2024-09-25
Payer: MEDICARE

## 2024-10-24 ENCOUNTER — APPOINTMENT (OUTPATIENT)
Dept: UROLOGY | Facility: CLINIC | Age: 84
End: 2024-10-24
Payer: MEDICARE

## 2024-10-24 VITALS
SYSTOLIC BLOOD PRESSURE: 155 MMHG | DIASTOLIC BLOOD PRESSURE: 71 MMHG | TEMPERATURE: 96.3 F | HEART RATE: 55 BPM | WEIGHT: 141 LBS | BODY MASS INDEX: 25.95 KG/M2 | HEIGHT: 62 IN

## 2024-10-24 DIAGNOSIS — N39.46 MIXED STRESS AND URGE URINARY INCONTINENCE: Primary | ICD-10-CM

## 2024-10-24 LAB
POC APPEARANCE, URINE: CLEAR
POC BILIRUBIN, URINE: NEGATIVE
POC BLOOD, URINE: NEGATIVE
POC COLOR, URINE: YELLOW
POC GLUCOSE, URINE: NEGATIVE MG/DL
POC KETONES, URINE: NEGATIVE MG/DL
POC LEUKOCYTES, URINE: ABNORMAL
POC NITRITE,URINE: NEGATIVE
POC PH, URINE: 6 PH
POC PROTEIN, URINE: NEGATIVE MG/DL
POC SPECIFIC GRAVITY, URINE: 1.02
POC UROBILINOGEN, URINE: 0.2 EU/DL

## 2024-10-24 PROCEDURE — 51798 US URINE CAPACITY MEASURE: CPT | Performed by: NURSE PRACTITIONER

## 2024-10-24 PROCEDURE — 81003 URINALYSIS AUTO W/O SCOPE: CPT | Performed by: NURSE PRACTITIONER

## 2024-10-24 PROCEDURE — 1157F ADVNC CARE PLAN IN RCRD: CPT | Performed by: NURSE PRACTITIONER

## 2024-10-24 PROCEDURE — G2211 COMPLEX E/M VISIT ADD ON: HCPCS | Performed by: NURSE PRACTITIONER

## 2024-10-24 PROCEDURE — 3077F SYST BP >= 140 MM HG: CPT | Performed by: NURSE PRACTITIONER

## 2024-10-24 PROCEDURE — 99213 OFFICE O/P EST LOW 20 MIN: CPT | Performed by: NURSE PRACTITIONER

## 2024-10-24 PROCEDURE — 1159F MED LIST DOCD IN RCRD: CPT | Performed by: NURSE PRACTITIONER

## 2024-10-24 PROCEDURE — 1036F TOBACCO NON-USER: CPT | Performed by: NURSE PRACTITIONER

## 2024-10-24 PROCEDURE — 3078F DIAST BP <80 MM HG: CPT | Performed by: NURSE PRACTITIONER

## 2024-10-24 NOTE — PATIENT INSTRUCTIONS
Psyllium fiber (metamucil) capsules puritan pride green bottle amazon  Continue Gemtesa 75 mg daily  Discussed axonics sacral neuomodulation  Also discussed cholestipol, could also see GI w chronic diarreha/loose stools    Nurse line [phone]  6 mos, call sooner if needs more treatment

## 2024-10-24 NOTE — PROGRESS NOTES
10/24/24   [id number]    Follow up urge incontinence, prolapse, GSM, nocturia     Subjective      HPI Ludivina Erwin is a 84 y.o. female who presents for urge incontinence, prolapse, GSM, nocturia; last seen 5/9/24; Med response with increasing Myrbetriq to 50 mg, referred to  assistance pharmacy program last visit; now on Gemtesa 75 mg daily past few weeks; was having incontinence few x per week on Myrbetriq; Doing better w Gemtesa;     Daughter felt she was doing ok on Myrbetriq, feels she is 80-90% better w Gemtesa but now complaining about the diarrhea;    PVR 0 ml, small leuk, no symptoms of UTI    Diarrhea chronic, nervous stomach, saw GI recently, not recommended colonoscopy, feels maybe he also recommended psyllium fiber but didn't try it;     s/p sacrospinous ligament fixation, anterior repair, and perineoplasty 6/6/22 by Dr. Jaimes; no recurrence of prolapse, happy w surgery; no WESTON;     w memory issues last visit we had decided to stop the trospium and switched to Myrbetriq;     PMH, PSH, FH, SH reviewed.      Imported from Dr. Jaimes last notes on 6/30/22 Last seen by myself 4/28/22. 82 year old female presents for postoperative evaluation s/p sacrospinous ligament fixation, anterior repair, and perineoplasty 6/6/22.      She had very minimal pain postoperatively and has not noticed any prolapse recurrence. Patient does note that her urinary flow has improved but is angulated forward a bit. She reports DTF every 1.5 hours and NTF x2. She wears a pad for very minor urinary incontinence. Patient feels that her urinary urgency has increased postoperatively. Her bowels have been well managed. Denies gross hematuria, dysuria, nocturia, flank pain, and bothersome frequency or urgency. Patient is a nonsmoker.         1. Anteroapical prolapse and rectocele  1a. POP-Q 5/6/21 with Dr. Shea: Aa: +2. C: -6 Gh: 5. Pb: 2 TVL: 8 Ap: -1. Bp: -1.  1b. POP-Q 3/10/22 : Aa: 3. Ba: 4 C: 0 Gh: 4.5. TVL: 8 Ap: 0. Bp: 0.  "  1c. Did not tolerate pessary well   1d. no occult WESTON seen on UDS 4/28/22   1e. s/p sacrospinous ligament fixation, anterior repair, and perineoplasty 6/6/22 C: -9 at end of procedure  2. Vaginal atrophy on estrogen cream  3. Urge dominant BACILIO - wears pads  3a. DTF x6 NTF x0-1 as of 3/10/22  3b. 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 no difficulty w upper body movement      Assessment/Plan   Problem List Items Addressed This Visit    None  Visit Diagnoses       Mixed stress and urge urinary incontinence    -  Primary    Relevant Orders    POCT UA Automated manually resulted    Post-Void Residual          Orders Placed This Encounter   Procedures    Post-Void Residual    POCT UA Automated manually resulted     Order Specific Question:   Release result to [facility]     Answer:   Immediate [1]     Psyllium fiber (metamucil) capsules pur[employer] pride green bottle amazon  Continue Gemtesa 75 mg daily  Discussed axonics sacral neuomodulation  Also discussed cholestipol, could also see GI w chronic diarreha/loose stools    Nurse line [phone]  6 mos, call sooner if needs more treatment        Linda Lane, APRN-CNP  Lab Results   Component Value Date    GLUCOSE 92 05/15/2023    CALCIUM 9.6 05/15/2023     05/15/2023    K 3.9 05/15/2023    CO2 25 05/15/2023     05/15/2023    BUN 12 05/15/2023    CREATININE 0.56 05/15/2023       "

## 2024-10-30 ENCOUNTER — OFFICE VISIT (OUTPATIENT)
Dept: OTOLARYNGOLOGY | Facility: CLINIC | Age: 84
End: 2024-10-30
Payer: MEDICARE

## 2024-10-30 VITALS
WEIGHT: 141.25 LBS | DIASTOLIC BLOOD PRESSURE: 72 MMHG | OXYGEN SATURATION: 97 % | BODY MASS INDEX: 25.99 KG/M2 | TEMPERATURE: 97.3 F | SYSTOLIC BLOOD PRESSURE: 177 MMHG | HEIGHT: 62 IN | RESPIRATION RATE: 16 BRPM | HEART RATE: 58 BPM

## 2024-10-30 DIAGNOSIS — M54.2 NECK PAIN: ICD-10-CM

## 2024-10-30 DIAGNOSIS — J34.89 NASAL DRAINAGE: ICD-10-CM

## 2024-10-30 DIAGNOSIS — J31.0 CHRONIC RHINITIS: Primary | ICD-10-CM

## 2024-10-30 DIAGNOSIS — J32.0 CHRONIC MAXILLARY SINUSITIS: ICD-10-CM

## 2024-10-30 PROCEDURE — 1160F RVW MEDS BY RX/DR IN RCRD: CPT | Performed by: STUDENT IN AN ORGANIZED HEALTH CARE EDUCATION/TRAINING PROGRAM

## 2024-10-30 PROCEDURE — 3078F DIAST BP <80 MM HG: CPT | Performed by: STUDENT IN AN ORGANIZED HEALTH CARE EDUCATION/TRAINING PROGRAM

## 2024-10-30 PROCEDURE — 99213 OFFICE O/P EST LOW 20 MIN: CPT | Performed by: STUDENT IN AN ORGANIZED HEALTH CARE EDUCATION/TRAINING PROGRAM

## 2024-10-30 PROCEDURE — 3077F SYST BP >= 140 MM HG: CPT | Performed by: STUDENT IN AN ORGANIZED HEALTH CARE EDUCATION/TRAINING PROGRAM

## 2024-10-30 PROCEDURE — 1126F AMNT PAIN NOTED NONE PRSNT: CPT | Performed by: STUDENT IN AN ORGANIZED HEALTH CARE EDUCATION/TRAINING PROGRAM

## 2024-10-30 PROCEDURE — 1159F MED LIST DOCD IN RCRD: CPT | Performed by: STUDENT IN AN ORGANIZED HEALTH CARE EDUCATION/TRAINING PROGRAM

## 2024-10-30 PROCEDURE — 1036F TOBACCO NON-USER: CPT | Performed by: STUDENT IN AN ORGANIZED HEALTH CARE EDUCATION/TRAINING PROGRAM

## 2024-10-30 PROCEDURE — 1157F ADVNC CARE PLAN IN RCRD: CPT | Performed by: STUDENT IN AN ORGANIZED HEALTH CARE EDUCATION/TRAINING PROGRAM

## 2024-10-30 RX ORDER — IPRATROPIUM BROMIDE 42 UG/1
2 SPRAY, METERED NASAL 3 TIMES DAILY PRN
Qty: 15 ML | Refills: 11 | Status: SHIPPED | OUTPATIENT
Start: 2024-10-30

## 2024-10-30 SDOH — ECONOMIC STABILITY: FOOD INSECURITY: WITHIN THE PAST 12 MONTHS, YOU WORRIED THAT YOUR FOOD WOULD RUN OUT BEFORE YOU GOT MONEY TO BUY MORE.: NEVER TRUE

## 2024-10-30 SDOH — ECONOMIC STABILITY: FOOD INSECURITY: WITHIN THE PAST 12 MONTHS, THE FOOD YOU BOUGHT JUST DIDN'T LAST AND YOU DIDN'T HAVE MONEY TO GET MORE.: NEVER TRUE

## 2024-10-30 ASSESSMENT — PATIENT HEALTH QUESTIONNAIRE - PHQ9
SUM OF ALL RESPONSES TO PHQ9 QUESTIONS 1 AND 2: 0
1. LITTLE INTEREST OR PLEASURE IN DOING THINGS: NOT AT ALL
2. FEELING DOWN, DEPRESSED OR HOPELESS: NOT AT ALL

## 2024-10-30 ASSESSMENT — COLUMBIA-SUICIDE SEVERITY RATING SCALE - C-SSRS
1. IN THE PAST MONTH, HAVE YOU WISHED YOU WERE DEAD OR WISHED YOU COULD GO TO SLEEP AND NOT WAKE UP?: NO
6. HAVE YOU EVER DONE ANYTHING, STARTED TO DO ANYTHING, OR PREPARED TO DO ANYTHING TO END YOUR LIFE?: NO
2. HAVE YOU ACTUALLY HAD ANY THOUGHTS OF KILLING YOURSELF?: NO

## 2024-10-30 ASSESSMENT — ENCOUNTER SYMPTOMS
OCCASIONAL FEELINGS OF UNSTEADINESS: 1
LOSS OF SENSATION IN FEET: 0
DEPRESSION: 0

## 2024-10-30 ASSESSMENT — LIFESTYLE VARIABLES
HOW OFTEN DO YOU HAVE A DRINK CONTAINING ALCOHOL: NEVER
HOW MANY STANDARD DRINKS CONTAINING ALCOHOL DO YOU HAVE ON A TYPICAL DAY: PATIENT DOES NOT DRINK
HOW OFTEN DO YOU HAVE SIX OR MORE DRINKS ON ONE OCCASION: NEVER
SKIP TO QUESTIONS 9-10: 1
AUDIT-C TOTAL SCORE: 0

## 2024-10-30 ASSESSMENT — PAIN SCALES - GENERAL: PAINLEVEL_OUTOF10: 0-NO PAIN

## 2024-11-11 ENCOUNTER — HOSPITAL ENCOUNTER (OUTPATIENT)
Dept: RADIOLOGY | Facility: HOSPITAL | Age: 84
Discharge: HOME | End: 2024-11-11
Payer: MEDICARE

## 2024-11-11 ENCOUNTER — OFFICE VISIT (OUTPATIENT)
Dept: PRIMARY CARE | Facility: CLINIC | Age: 84
End: 2024-11-11
Payer: MEDICARE

## 2024-11-11 VITALS
SYSTOLIC BLOOD PRESSURE: 166 MMHG | BODY MASS INDEX: 25.83 KG/M2 | WEIGHT: 140.38 LBS | TEMPERATURE: 97.3 F | HEIGHT: 62 IN | RESPIRATION RATE: 16 BRPM | OXYGEN SATURATION: 97 % | DIASTOLIC BLOOD PRESSURE: 96 MMHG | HEART RATE: 59 BPM

## 2024-11-11 DIAGNOSIS — S46.812S TRAPEZIUS MUSCLE STRAIN, LEFT, SEQUELA: Primary | ICD-10-CM

## 2024-11-11 DIAGNOSIS — R91.1 LUNG NODULE: ICD-10-CM

## 2024-11-11 DIAGNOSIS — S46.812S TRAPEZIUS MUSCLE STRAIN, LEFT, SEQUELA: ICD-10-CM

## 2024-11-11 DIAGNOSIS — M19.90 ARTHRITIS: ICD-10-CM

## 2024-11-11 PROCEDURE — 99213 OFFICE O/P EST LOW 20 MIN: CPT | Performed by: NURSE PRACTITIONER

## 2024-11-11 PROCEDURE — 72040 X-RAY EXAM NECK SPINE 2-3 VW: CPT

## 2024-11-11 PROCEDURE — 72040 X-RAY EXAM NECK SPINE 2-3 VW: CPT | Performed by: RADIOLOGY

## 2024-11-11 RX ORDER — PREDNISONE 20 MG/1
40 TABLET ORAL DAILY
Qty: 10 TABLET | Refills: 0 | Status: SHIPPED | OUTPATIENT
Start: 2024-11-11 | End: 2024-11-16

## 2024-11-11 ASSESSMENT — PAIN SCALES - GENERAL: PAINLEVEL_OUTOF10: 7

## 2024-11-11 ASSESSMENT — ENCOUNTER SYMPTOMS
LOSS OF SENSATION IN FEET: 0
DEPRESSION: 0
OCCASIONAL FEELINGS OF UNSTEADINESS: 1

## 2024-11-11 ASSESSMENT — PATIENT HEALTH QUESTIONNAIRE - PHQ9
2. FEELING DOWN, DEPRESSED OR HOPELESS: NOT AT ALL
1. LITTLE INTEREST OR PLEASURE IN DOING THINGS: NOT AT ALL
SUM OF ALL RESPONSES TO PHQ9 QUESTIONS 1 AND 2: 0

## 2024-11-11 NOTE — PROGRESS NOTES
Subjective   Patient ID: Ludivina Erwin is a 84 y.o. female who presents for Neck Pain (Patient has had neck pain for several months, 7/10).  HPI 84-year-old female presents today with her daughter due to left neck strain.    Symptoms have increased over the past month.  Patient does sleep on her side with her arm under her head which may be aggravating her symptoms.  At its worst symptoms can go up to 7/10.  Presently discomfort is 2/10 and limited to the left trapezius muscle.   She denies any numbness or tingling to her hand or radiation of symptoms at this time.   She is using Tylenol with some relief of symptoms.        3/24/2023 CT Cervical spine   Moderate levocurvature distally. Straightening of the lower cervical   lordosis. Trace anterolisthesis at C4-5.   No cervical vertebral compression deformity or acute displaced   fracture. Mild scoliosis, spondylolisthesis and multilevel   degenerative changes noted.       CT chest 3/4/2024 --degenerative spine    She is not diabetic.    She does have memory issues and is a falls risk.   Muscle relaxer deferred.      Review of Systems  Review of systems: Present-feeling well. Not present-chills, fatigue and fever.  Skin: Not present- new lesions and rash.  HEENT: Not present-headache, ear pain, nasal congestion, and sore throat.  Neck: Not present- swollen glands.  Respiratory: Not present-difficulty breathing, cough, bloody sputum.  Cardiovascular: Not present-abnormal blood pressure, chest pain, edema, palpitations, dyspnea on exertion.  Gastrointestinal: Not present-abdominal pain, bloody or very black stools, heartburn, nausea and vomiting.  Genitourinary: Not present-change in bladder habits, hematuria, flank pain and dysuria.  Musculoskeletal: Not present- joint swelling, joint redness.  Neurological: Not present-dizziness, headache, numbness or tingling.    Objective   BP (!) 166/96   Pulse 59   Temp 36.3 °C (97.3 °F) (Temporal)   Resp 16   Ht 1.575 m  "(5' 2\")   Wt 63.7 kg (140 lb 6 oz)   SpO2 97%   BMI 25.67 kg/m²      Physical Exam  Gen.: Mental status-alert. Gen. appearance-cooperative, well groomed and consistent with stated age. Not in acute distress or sickly. Orientation-oriented to time, place, purpose and person. Build and nutrition-well-nourished and well-developed. Hydration-well-hydrated.    Integumentary:Color-normal coloration skin. Skin moisture-normal skin moisture.    Head and neck: Head-normocephalic, atraumatic. Face-atraumatic. Neck-some discomfort with side to side motion of her neck.  Restricted mildly to left due to muscle tension. No lymphadenopathy.    ENMT: Ears-no tenderness noted to the external auditory canal-bilaterally. Otoscopic exam: Tympanic membranes-left-tympanic membrane is gray in appearance. Right-tympanic membrane is gray in appearance. Nose -frontal sinuses-non-tender and no purulent drainage. Maxillary sinuses-non-tender and no purulent drainage. Mouth: Oral mucosa-pink and moist. Oropharynx: No airway distress, bulging of the pharyngeal wall, edema of the uvula, and no pharyngeal erythema.    Chest and lung exam: Auscultation-normal breath sounds. Chest wall is normal in shape and non-tender.    Cardiovascular: Auscultation: Regular rate and rhythm. Heart sounds-normal heart sounds, S1-S2. No murmurs.    Abdomen: Non-tender, no rigidity. Auscultation-auscultation of the abdomen reveals normal bowel sounds throughout.     Peripheral vascular: Normal temperature and no edema bilaterally.    Musculoskeletal: Examination of the spine with no step-offs or point tenderness.  Muscle tension to trapezius muscle left   Equal hand grasps and bicep strength.      Assessment/Plan   Diagnoses and all orders for this visit:  Trapezius muscle strain, left, sequela  -     predniSONE (Deltasone) 20 mg tablet; Take 2 tablets (40 mg) by mouth once daily for 5 days.  -     XR cervical spine 2-3 views; Future  Lung nodule  -     CT chest " wo IV contrast; Future  Arthritis

## 2024-11-14 NOTE — PATIENT INSTRUCTIONS
Intermittent bouts of left trapezius muscle tension.  She is a falls risk and has memory loss.  Muscle relaxer deferred.  Continue with Tylenol as needed.   X-ray of cervical spine.  Patient will be notified of results as they become available.  She would benefit from physical therapy/Ortho-patient to follow-up with present Ortho provider as needed.    Prescription for prednisone given with precaution.   Advised to take with food.     Contact office with worsening condition or no resolve over the next week.   See patient education     CT Chest for follow up to lung nodule provided.

## 2024-11-15 ENCOUNTER — TELEPHONE (OUTPATIENT)
Dept: PRIMARY CARE | Facility: CLINIC | Age: 84
End: 2024-11-15
Payer: MEDICARE

## 2024-11-18 ENCOUNTER — TELEPHONE (OUTPATIENT)
Dept: PRIMARY CARE | Facility: CLINIC | Age: 84
End: 2024-11-18

## 2024-11-18 ENCOUNTER — TELEPHONE (OUTPATIENT)
Dept: UROLOGY | Facility: CLINIC | Age: 84
End: 2024-11-18

## 2024-11-18 ENCOUNTER — APPOINTMENT (OUTPATIENT)
Dept: OPHTHALMOLOGY | Facility: CLINIC | Age: 84
End: 2024-11-18
Payer: MEDICARE

## 2024-11-18 DIAGNOSIS — Z79.899 ENCOUNTER FOR LONG-TERM (CURRENT) USE OF HIGH-RISK MEDICATION: Primary | ICD-10-CM

## 2024-11-18 DIAGNOSIS — M06.00 SERONEGATIVE RHEUMATOID ARTHRITIS (MULTI): ICD-10-CM

## 2024-11-18 DIAGNOSIS — Z96.1 BILATERAL PSEUDOPHAKIA: ICD-10-CM

## 2024-11-18 DIAGNOSIS — M06.09 SERONEGATIVE ARTHROPATHY OF MULTIPLE SITES (MULTI): ICD-10-CM

## 2024-11-18 PROCEDURE — 92134 CPTRZ OPH DX IMG PST SGM RTA: CPT | Mod: BILATERAL PROCEDURE | Performed by: OPTOMETRIST

## 2024-11-18 PROCEDURE — 92083 EXTENDED VISUAL FIELD XM: CPT | Performed by: OPTOMETRIST

## 2024-11-18 PROCEDURE — 99214 OFFICE O/P EST MOD 30 MIN: CPT | Performed by: OPTOMETRIST

## 2024-11-18 RX ORDER — HYDROXYCHLOROQUINE SULFATE 200 MG/1
200 TABLET, FILM COATED ORAL DAILY
Qty: 90 TABLET | Refills: 0 | Status: SHIPPED | OUTPATIENT
Start: 2024-11-18

## 2024-11-18 ASSESSMENT — CONF VISUAL FIELD
OS_SUPERIOR_TEMPORAL_RESTRICTION: 0
OD_INFERIOR_TEMPORAL_RESTRICTION: 0
OS_INFERIOR_NASAL_RESTRICTION: 0
OS_INFERIOR_TEMPORAL_RESTRICTION: 0
OD_SUPERIOR_NASAL_RESTRICTION: 0
OD_INFERIOR_NASAL_RESTRICTION: 0
OD_SUPERIOR_TEMPORAL_RESTRICTION: 0
OS_NORMAL: 1
OS_SUPERIOR_NASAL_RESTRICTION: 0
OD_NORMAL: 1
METHOD: COUNTING FINGERS

## 2024-11-18 ASSESSMENT — ENCOUNTER SYMPTOMS
CARDIOVASCULAR NEGATIVE: 0
ENDOCRINE NEGATIVE: 0
GASTROINTESTINAL NEGATIVE: 0
RESPIRATORY NEGATIVE: 0
MUSCULOSKELETAL NEGATIVE: 0
NEUROLOGICAL NEGATIVE: 0
ALLERGIC/IMMUNOLOGIC NEGATIVE: 1
PSYCHIATRIC NEGATIVE: 0
CONSTITUTIONAL NEGATIVE: 0
HEMATOLOGIC/LYMPHATIC NEGATIVE: 0
EYES NEGATIVE: 1

## 2024-11-18 ASSESSMENT — TONOMETRY
IOP_METHOD: TONOPEN
OD_IOP_MMHG: 12
OS_IOP_MMHG: 11

## 2024-11-18 ASSESSMENT — REFRACTION_WEARINGRX
OD_SPHERE: +0.50
OS_AXIS: 152
OS_SPHERE: PLANO
OD_ADD: +2.75
OD_CYLINDER: -0.50
OS_ADD: +2.75
OD_AXIS: 025
OS_CYLINDER: -0.50

## 2024-11-18 ASSESSMENT — SLIT LAMP EXAM - LIDS
COMMENTS: NORMAL
COMMENTS: NORMAL

## 2024-11-18 ASSESSMENT — REFRACTION_MANIFEST
OS_ADD: +2.75
OS_CYLINDER: -0.25
OD_SPHERE: +0.50
OS_SPHERE: PLANO
OD_AXIS: 025
OS_AXIS: 152
OD_CYLINDER: -0.25
OD_ADD: +2.75

## 2024-11-18 ASSESSMENT — VISUAL ACUITY
METHOD: SNELLEN - LINEAR
OS_CC: 20/25
OS_CC+: +2
OD_CC+: -2
CORRECTION_TYPE: GLASSES
OD_CC: 20/20

## 2024-11-18 ASSESSMENT — EXTERNAL EXAM - LEFT EYE: OS_EXAM: NORMAL

## 2024-11-18 ASSESSMENT — EXTERNAL EXAM - RIGHT EYE: OD_EXAM: NORMAL

## 2024-11-18 NOTE — TELEPHONE ENCOUNTER
Patient daughter called in regards to getting a handicap placard for Ludivina.  1940, Phone # [phone]. Please call daughter Camryn with any questions at [phone]

## 2024-11-18 NOTE — Clinical Note
Hello,  This patient has some mild retinal pigmentary changes OD>OS. These changes have been present since 2020 but appear to have slightly progressed. I've asked the patient to return in 6 months to repeat the plaquenil testing. At this time it is hard to say if this change is d/t the HCQ vs a variant of normal aging. Will let you know what happens at the follow up. Lorenzo

## 2024-11-18 NOTE — PROGRESS NOTES
Assessment/Plan   Diagnoses and all orders for this visit:  Encounter for long-term (current) use of high-risk medication  -     OCT, Retina - OU - Both Eyes  -     [place] Visual Field - OU - Both Eyes  Seronegative rheumatoid arthritis (Multi)  Seronegative arthropathy of multiple sites (Multi)  Small pigmentary changes on OCT, slight progression from 2020. Unclear if d/t HCQ vs variant of normal aging. Will repeat plaquenil testing in 6 months to monitor. Communication to Dr. Paty Adan in [facility] regarding findings. Pt to RTC ASAP with changes in vision.     Bilateral pseudophakia  Refraction performed today for diagnostic purposes. Discussed with patient that they are not recommended to obtain glasses as there is no indication for glasses/new glasses at this time.   Ocular health WNL OU for patient age. Monitor 1 year or sooner prn. No refraction billed today.

## 2024-11-25 ENCOUNTER — OFFICE VISIT (OUTPATIENT)
Dept: PRIMARY CARE | Facility: CLINIC | Age: 84
End: 2024-11-25
Payer: MEDICARE

## 2024-11-25 VITALS
DIASTOLIC BLOOD PRESSURE: 75 MMHG | HEIGHT: 62 IN | TEMPERATURE: 97.1 F | BODY MASS INDEX: 25.76 KG/M2 | HEART RATE: 56 BPM | SYSTOLIC BLOOD PRESSURE: 165 MMHG | RESPIRATION RATE: 16 BRPM | WEIGHT: 140 LBS | OXYGEN SATURATION: 98 %

## 2024-11-25 DIAGNOSIS — M19.90 ARTHRITIS: Primary | ICD-10-CM

## 2024-11-25 DIAGNOSIS — S46.812S TRAPEZIUS MUSCLE STRAIN, LEFT, SEQUELA: ICD-10-CM

## 2024-11-25 PROCEDURE — 3077F SYST BP >= 140 MM HG: CPT | Performed by: NURSE PRACTITIONER

## 2024-11-25 PROCEDURE — 99213 OFFICE O/P EST LOW 20 MIN: CPT | Performed by: NURSE PRACTITIONER

## 2024-11-25 PROCEDURE — 1125F AMNT PAIN NOTED PAIN PRSNT: CPT | Performed by: NURSE PRACTITIONER

## 2024-11-25 PROCEDURE — 3078F DIAST BP <80 MM HG: CPT | Performed by: NURSE PRACTITIONER

## 2024-11-25 PROCEDURE — 1157F ADVNC CARE PLAN IN RCRD: CPT | Performed by: NURSE PRACTITIONER

## 2024-11-25 PROCEDURE — 1036F TOBACCO NON-USER: CPT | Performed by: NURSE PRACTITIONER

## 2024-11-25 PROCEDURE — 1159F MED LIST DOCD IN RCRD: CPT | Performed by: NURSE PRACTITIONER

## 2024-11-25 PROCEDURE — 1160F RVW MEDS BY RX/DR IN RCRD: CPT | Performed by: NURSE PRACTITIONER

## 2024-11-25 SDOH — ECONOMIC STABILITY: FOOD INSECURITY: WITHIN THE PAST 12 MONTHS, YOU WORRIED THAT YOUR FOOD WOULD RUN OUT BEFORE YOU GOT MONEY TO BUY MORE.: NEVER TRUE

## 2024-11-25 SDOH — ECONOMIC STABILITY: FOOD INSECURITY: WITHIN THE PAST 12 MONTHS, THE FOOD YOU BOUGHT JUST DIDN'T LAST AND YOU DIDN'T HAVE MONEY TO GET MORE.: NEVER TRUE

## 2024-11-25 ASSESSMENT — ENCOUNTER SYMPTOMS
LOSS OF SENSATION IN FEET: 0
DEPRESSION: 0
OCCASIONAL FEELINGS OF UNSTEADINESS: 0

## 2024-11-25 ASSESSMENT — PAIN SCALES - GENERAL: PAINLEVEL_OUTOF10: 4

## 2024-11-25 NOTE — PATIENT INSTRUCTIONS
Straightening of the cervical lordosis and left trapezius muscle tension, arthritis-some relief of symptoms with steroids.  Patient is a fall risk and he has memory loss therefore muscle relaxers deferred.  She has been in pain management before.  She did well with steroid injections.  Referred to pain management for further evaluation.

## 2024-11-25 NOTE — PROGRESS NOTES
Subjective   Patient ID: Ludivina Erwin is a 84 y.o. female who presents for Follow-up (Patient presents for follow up visit for neck strain. Patient stated left side of neck is sore. ).  HPI 84-year-old female presents today for left neck strain.  Discomfort continues after course of prednisone.  She has had steroid injections in the past per daughter.      She is not diabetic.    She does have memory issues and is a falls risk.   Muscle relaxer deferred.     Pain presently 2-3/10 and limited to the trapezius muscle left side.      11/11/2024 xray cervical spine  Generalized osteopenia is present.  There is straightening of the  cervical lordosis which may be exaggerated by positioning and/or  spasm. Multilevel disc space narrowing and endplate spurring of the  cervical spine is most prominent from C4-5 through C7-T1. Multilevel  facet arthrosis is present bilaterally throughout the cervical spine.  A slight anterolisthesis of C4 relative to C5 likely is  chronic/degenerative. No acute fracture or compression deformity is  seen. Prevertebral soft tissues are not abnormally thickened.    Osteopenia with multilevel degenerative disc and facet changes   throughout the cervical spine.   Slight anterolisthesis of C4 relative to C5, more likely  chronic/degenerative.    3/24/2023 CT Cervical spine   Moderate levocurvature distally. Straightening of the lower cervical   lordosis. Trace anterolisthesis at C4-5.   No cervical vertebral compression deformity or acute displaced   fracture. Mild scoliosis, spondylolisthesis and multilevel   degenerative changes noted.       CT chest 3/4/2024 --degenerative spine    Review of Systems  Review of systems: Present-feeling well. Not present-chills, fatigue and fever.  Skin: Not present-new lesions and rash.  HEENT: Not present-headache, ear pain, nasal congestion, and sore throat.  Neck: Not present-neck pain, neck stiffness and swollen glands.  Respiratory: Not present-difficulty  "breathing, cough, bloody sputum.  Cardiovascular: Hypertension.   Not present-chest pain, edema, palpitations, dyspnea on exertion.  Gastrointestinal: Not present-abdominal pain, bloody or very black stools, changes in bowel habits, heartburn, jaundice, nausea and vomiting.  Genitourinary: Not present-change in bladder habits, hematuria, flank pain and dysuria.  Musculoskeletal: Not present-joint pain, joint swelling, joint redness.    Objective   /75   Pulse 56   Temp 36.2 °C (97.1 °F) (Temporal)   Resp 16   Ht 1.575 m (5' 2\")   Wt 63.5 kg (140 lb)   SpO2 98%   BMI 25.61 kg/m²      Physical Exam  Gen.: Mental status-alert. Gen. appearance-cooperative, well groomed and consistent with stated age. Not in acute distress or sickly. Orientation-oriented to time, place, purpose and person. Build and nutrition-well-nourished and well-developed. Hydration-well-hydrated.    Integumentary: Color-normal coloration skin. Skin moisture-normal skin moisture.    Head and neck: Head-normocephalic, atraumatic with no lesions or palpable masses. Face-atraumatic. Neck-full range of motion and subtle. No lymphadenopathy and no nuchal rigidity.     Chest and lung exam: Auscultation-normal breath sounds, no adventitious lung sounds and normal vocal resonance. Chest wall is normal in shape and non-tender.    Cardiovascular: Auscultation: Regular rate and rhythm. Heart sounds-normal heart sounds, S1-S2. No murmurs or gallops appreciated. Carotid arteries without bruit.    Abdomen: Non-tender, no rigidity, and no palpable masses. There is no hepatosplenomegaly. Auscultation-auscultation of the abdomen reveals normal bowel sounds throughout.     Peripheral vascular:  Normal temperature and no edema bilaterally.    Musculoskeletal: Examination of the spine with no step-offs or point tenderness.  Full range of motion of the spine without pain or difficulty.   Left trapezius muscle strain.     Equal hand grasp and bicep strength.  "     Right lower extremity-normal strength and tone, normal range of motion without pain.   Left lower extremity-normal strength and tone, normal range of motion without pain.  Assessment/Plan   Diagnoses and all orders for this visit:  Arthritis  -     Referral to Pain Medicine; Future  Trapezius muscle strain, left, sequela  -     Referral to Pain Medicine; Future

## 2024-12-06 ENCOUNTER — OFFICE VISIT (OUTPATIENT)
Dept: PAIN MEDICINE | Facility: CLINIC | Age: 84
End: 2024-12-06
Payer: MEDICARE

## 2024-12-06 VITALS
BODY MASS INDEX: 25.76 KG/M2 | HEIGHT: 62 IN | RESPIRATION RATE: 16 BRPM | SYSTOLIC BLOOD PRESSURE: 161 MMHG | HEART RATE: 61 BPM | TEMPERATURE: 97.9 F | WEIGHT: 140 LBS | DIASTOLIC BLOOD PRESSURE: 67 MMHG | OXYGEN SATURATION: 95 %

## 2024-12-06 DIAGNOSIS — M47.22 CERVICAL SPONDYLOSIS WITH RADICULOPATHY: Primary | ICD-10-CM

## 2024-12-06 DIAGNOSIS — M19.90 ARTHRITIS: ICD-10-CM

## 2024-12-06 DIAGNOSIS — S46.812S TRAPEZIUS MUSCLE STRAIN, LEFT, SEQUELA: ICD-10-CM

## 2024-12-06 PROCEDURE — 1157F ADVNC CARE PLAN IN RCRD: CPT | Performed by: ANESTHESIOLOGY

## 2024-12-06 PROCEDURE — 1125F AMNT PAIN NOTED PAIN PRSNT: CPT | Performed by: ANESTHESIOLOGY

## 2024-12-06 PROCEDURE — 3078F DIAST BP <80 MM HG: CPT | Performed by: ANESTHESIOLOGY

## 2024-12-06 PROCEDURE — 99204 OFFICE O/P NEW MOD 45 MIN: CPT | Performed by: ANESTHESIOLOGY

## 2024-12-06 PROCEDURE — 1159F MED LIST DOCD IN RCRD: CPT | Performed by: ANESTHESIOLOGY

## 2024-12-06 PROCEDURE — 99214 OFFICE O/P EST MOD 30 MIN: CPT | Performed by: ANESTHESIOLOGY

## 2024-12-06 PROCEDURE — 3077F SYST BP >= 140 MM HG: CPT | Performed by: ANESTHESIOLOGY

## 2024-12-06 PROCEDURE — 1036F TOBACCO NON-USER: CPT | Performed by: ANESTHESIOLOGY

## 2024-12-06 RX ORDER — FLUTICASONE PROPIONATE 50 MCG
SPRAY, SUSPENSION (ML) NASAL
COMMUNITY
Start: 2024-09-01

## 2024-12-06 SDOH — ECONOMIC STABILITY: FOOD INSECURITY: WITHIN THE PAST 12 MONTHS, YOU WORRIED THAT YOUR FOOD WOULD RUN OUT BEFORE YOU GOT MONEY TO BUY MORE.: NEVER TRUE

## 2024-12-06 SDOH — ECONOMIC STABILITY: FOOD INSECURITY: WITHIN THE PAST 12 MONTHS, THE FOOD YOU BOUGHT JUST DIDN'T LAST AND YOU DIDN'T HAVE MONEY TO GET MORE.: NEVER TRUE

## 2024-12-06 ASSESSMENT — ENCOUNTER SYMPTOMS
OCCASIONAL FEELINGS OF UNSTEADINESS: 1
GASTROINTESTINAL NEGATIVE: 1
ENDOCRINE NEGATIVE: 1
DEPRESSION: 0
CARDIOVASCULAR NEGATIVE: 1
HEMATOLOGIC/LYMPHATIC NEGATIVE: 1
MYALGIAS: 1
PSYCHIATRIC NEGATIVE: 1
RESPIRATORY NEGATIVE: 1
EYES NEGATIVE: 1
NECK PAIN: 1
LOSS OF SENSATION IN FEET: 0

## 2024-12-06 ASSESSMENT — PAIN - FUNCTIONAL ASSESSMENT: PAIN_FUNCTIONAL_ASSESSMENT: 0-10

## 2024-12-06 ASSESSMENT — PAIN DESCRIPTION - DESCRIPTORS: DESCRIPTORS: SHARP

## 2024-12-06 ASSESSMENT — COLUMBIA-SUICIDE SEVERITY RATING SCALE - C-SSRS
1. IN THE PAST MONTH, HAVE YOU WISHED YOU WERE DEAD OR WISHED YOU COULD GO TO SLEEP AND NOT WAKE UP?: NO
2. HAVE YOU ACTUALLY HAD ANY THOUGHTS OF KILLING YOURSELF?: NO
6. HAVE YOU EVER DONE ANYTHING, STARTED TO DO ANYTHING, OR PREPARED TO DO ANYTHING TO END YOUR LIFE?: NO

## 2024-12-06 ASSESSMENT — PAIN SCALES - GENERAL
PAINLEVEL_OUTOF10: 9
PAINLEVEL_OUTOF10: 9

## 2024-12-06 ASSESSMENT — PATIENT HEALTH QUESTIONNAIRE - PHQ9
2. FEELING DOWN, DEPRESSED OR HOPELESS: NOT AT ALL
SUM OF ALL RESPONSES TO PHQ9 QUESTIONS 1 AND 2: 0
1. LITTLE INTEREST OR PLEASURE IN DOING THINGS: NOT AT ALL

## 2024-12-06 ASSESSMENT — LIFESTYLE VARIABLES: TOTAL SCORE: 0

## 2024-12-06 NOTE — LETTER
December 6, 2024     Becki Coronado, APRN-CNP  [address]  [facility] [address]    Patient: Ludivina Erwin   YOB: 1940   Date of Visit: 12/6/2024       Dear Dr. Becki Coronado, APRN-CNP:    Thank you for referring Ludivina Erwin to me for evaluation. Below are my notes for this consultation.  If you have questions, please do not hesitate to call me. I look forward to following your patient along with you.       Sincerely,     David Lee MD      CC: No Recipients  ______________________________________________________________________________________    No chief complaint on file.    History of Present Complaint:  The patient was referred to us by Referring Provider: Hilaria Cotter. this is 84 y.o.  female  with a past history of   erosive arthritis on hydroxychloroquine and Plaquenil, cervical spondylosis with radiculopathy worse at C4-5 responded in the past with cervical SKIP presenting with straightening of the cervical lordosis, trapezius muscle spasm    Pain started one month ago .   Pain is better nothing .  Pain is worst , coughing , swallow , or sneezing that radiales into the left side id her chest       The pain is described as sharp .    Prior Pain Therapies:  none  Past surgical history:   none    Employment/disability/litigation: retired housewife  Social history: , Has 3children, 10grandchildren and 2great-grandchildren, Finished high school, and Higher education cosmetology school    Diagnostic studies: MRI studies done yesterday at the [facility]    Opioid Risk Assessment Score 0/26    Vipul Each Box that Applies Female Male   FAMILY HISTORY OF SUBSTANCE ABUSE  Vipul the boxes that applies   Alcohol ?  1    ? 3   Illegal drugs ?  2 ? 3   Rx drugs ?  4 ? 4   PERSONAL HISTORY OF SUBSTNACE ABUSE   Alcohol ?  3 ?  3   Illegal drugs ?  4 ?  4    Rx drugs ?  5 ?  5   Age Between 16-45 years ?  1 ?  1   History of Preadolescent Sexual Abuse ?  3 ?  0    PSYCHOLOGIC DISEASE   ADD, OCD, bipolar, schizophrenia   ?  2 ?  2   Depression ?  1 ?  1   Scoring Totals 0      Scoring (Risk)  0-3 - Low  4-7 - Moderate  8 - High  Opioid Risk Assessment Score 0/26             History of Present Complaint:  The patient was referred to us by Referring Provider: Becki Coronado, ANTONIETA-CNP . this is 84 y.o.  female accompanied by her daughter but her other daughter who is a nurse at [employer] takes care of her medication and health issues with a past history of memory disturbances and getting more forgetful, erosive arthritis on hydroxychloroquine and Plaquenil, cervical spondylosis with radiculopathy worse at C4-5 responded in the past with cervical SKIP presenting with with significant neck pain that radiates to her left therapies and anterior of her mandible to her left shoulder.  The patient wearing Lidoderm patch but is not really helping much with the pain causing restriction of her neck movement.  The patient has no weakness no numbness no tingling no balance issues.  No paralysis no saddle paresthesia no incontinence no red flags        Procedures:   Cervical SKIP in the past with good results    Portions of record reviewed for pertinent issues: active problem list, medication list, allergies, family history, social history, notes from last encounter, encounters, lab results, imaging and other available records.    I have personally reviewed the OARRS report for this patient. This report is scanned into the electronic medical record. I have considered the risks of abuse, dependence, addiction and diversion. It showed: No controlled substance  OPIOID RISK ASSESSMENT SCORE 2/26  Aberrant behavior: Forgetful  My patient has no underlying substance abuse or alcohol abuse and there's no mental health conditions contributing to the patient's pain.        Diagnostic studies:  11/11/2024 cervical x-ray showed degenerative changes with spondylosis and some foraminal stenosis  3/24/2023 CT  cervical spine mild scoliosis with degenerative changes      Employment/disability/litigation: Beautician retired housewife  Social History:  her  present with her has 3 children and 10 grandchildren and 2 great-grandchildren finished high school and cosmetology school denies drinking smoking or use of illicit drugs      Review of Systems   HENT: Negative.     Eyes: Negative.    Respiratory: Negative.     Cardiovascular: Negative.    Gastrointestinal: Negative.    Endocrine: Negative.    Genitourinary: Negative.    Musculoskeletal:  Positive for myalgias and neck pain.   Skin: Negative.    Hematological: Negative.    Psychiatric/Behavioral: Negative.            Physical Exam  Vitals and nursing note reviewed.   Constitutional:       Appearance: Normal appearance.       HENT:      Head: Normocephalic and atraumatic.      Nose: Nose normal.   Eyes:      Extraocular Movements: Extraocular movements intact.      Conjunctiva/sclera: Conjunctivae normal.      Pupils: Pupils are equal, round, and reactive to light.   Cardiovascular:      Rate and Rhythm: Normal rate and regular rhythm.      Pulses: Normal pulses.      Heart sounds: Normal heart sounds.   Pulmonary:      Effort: Pulmonary effort is normal.      Breath sounds: Normal breath sounds.   Abdominal:      General: Abdomen is flat. Bowel sounds are normal.      Palpations: Abdomen is soft.   Musculoskeletal:         General: Tenderness present.   Skin:     General: Skin is warm.   Neurological:      General: No focal deficit present.      Mental Status: She is alert.      Cranial Nerves: Cranial nerves 2-12 are intact.      Sensory: Sensation is intact.      Motor: Motor function is intact.      Coordination: Coordination is intact.      Gait: Gait is intact.      Deep Tendon Reflexes: Reflexes are normal and symmetric.      Comments: Lhermitte's - Spurling's + on the left with left-sided neck radiation to the left trapezius   Psychiatric:          Mood and Affect: Mood normal.         Behavior: Behavior normal.            Assessment  Very pleasant 84 years old with memory challenged complaining of significant left-sided neck pain that radiated to her below the jaw the lateral aspect of the neck to the trapezius correlate with the C4 nerve root.  There is a note in the chart stated that the patient got great relief from an injection in the past which is logical and because of her memory issue I really hate to place her on prednisone taper dose and it would be easier to give her 1 dose Depo-Medrol was cervical epidural.  In the meantime we will get her involved with the chiropractic care and the physical therapy.           Plan  At least 50% of the visit was involved in the discussion of the options for treatment. We discussed exercises, medication, interventional therapies and surgery. Healthy life style is essential with patient hard work to achieve the wellness. In addition; discussion with the patient and/or family about any of the diagnostic results, impressions and/or recommended diagnostic studies, prognosis, risks and benefits of treatment options, instructions for treatment and/or follow-up, importance of compliance with chosen treatment options, risk-factor reduction, and patient/family education.           Recommended Pool therapy, walking in the pool, at least 3x per week for 30 minutes  Recommend self-directed physical therapy with at least daily exercises for minimum of 20-minute, brochure was handed to the patient  Referral to physical therapy  Referral to chiropractic care  Left C6-7 interlaminar SKIP  Healthy lifestyle and anti-inflammatory diet in addition to weight control discussed with the patient  Alternative chronic pain therapies was discussed, encouraged and information was handed  Return to Clinic after the injection       *Please note this report has been produced using speech recognition software and may contain errors related to that  system including grammar, punctuation and spelling as well as words and phrases that may be inappropriate. If there are questions or concerns, please feel free to contact me to clarify.    David Lee MD

## 2024-12-06 NOTE — H&P (VIEW-ONLY)
History of Present Complaint:  The patient was referred to us by Referring Provider: Becki Coronado, APRN-CNP . this is 84 y.o.  female accompanied by her daughter but her other daughter who is a nurse at [employer] takes care of her medication and health issues with a past history of memory disturbances and getting more forgetful, erosive arthritis on hydroxychloroquine and Plaquenil, cervical spondylosis with radiculopathy worse at C4-5 responded in the past with cervical SKIP presenting with with significant neck pain that radiates to her left therapies and anterior of her mandible to her left shoulder.  The patient wearing Lidoderm patch but is not really helping much with the pain causing restriction of her neck movement.  The patient has no weakness no numbness no tingling no balance issues.  No paralysis no saddle paresthesia no incontinence no red flags        Procedures:   Cervical SKIP in the past with good results    Portions of record reviewed for pertinent issues: active problem list, medication list, allergies, family history, social history, notes from last encounter, encounters, lab results, imaging and other available records.    I have personally reviewed the OARRS report for this patient. This report is scanned into the electronic medical record. I have considered the risks of abuse, dependence, addiction and diversion. It showed: No controlled substance  OPIOID RISK ASSESSMENT SCORE 2/26  Aberrant behavior: Forgetful  My patient has no underlying substance abuse or alcohol abuse and there's no mental health conditions contributing to the patient's pain.        Diagnostic studies:  11/11/2024 cervical x-ray showed degenerative changes with spondylosis and some foraminal stenosis  3/24/2023 CT cervical spine mild scoliosis with degenerative changes      Employment/disability/litigation: Beautician retired housewife  Social History:  her  present with her has 3 children and 10 grandchildren  and 2 great-grandchildren finished high school and cosmetology school denies drinking smoking or use of illicit drugs      Review of Systems   HENT: Negative.     Eyes: Negative.    Respiratory: Negative.     Cardiovascular: Negative.    Gastrointestinal: Negative.    Endocrine: Negative.    Genitourinary: Negative.    Musculoskeletal:  Positive for myalgias and neck pain.   Skin: Negative.    Hematological: Negative.    Psychiatric/Behavioral: Negative.            Physical Exam  Vitals and nursing note reviewed.   Constitutional:       Appearance: Normal appearance.       HENT:      Head: Normocephalic and atraumatic.      Nose: Nose normal.   Eyes:      Extraocular Movements: Extraocular movements intact.      Conjunctiva/sclera: Conjunctivae normal.      Pupils: Pupils are equal, round, and reactive to light.   Cardiovascular:      Rate and Rhythm: Normal rate and regular rhythm.      Pulses: Normal pulses.      Heart sounds: Normal heart sounds.   Pulmonary:      Effort: Pulmonary effort is normal.      Breath sounds: Normal breath sounds.   Abdominal:      General: Abdomen is flat. Bowel sounds are normal.      Palpations: Abdomen is soft.   Musculoskeletal:         General: Tenderness present.   Skin:     General: Skin is warm.   Neurological:      General: No focal deficit present.      Mental Status: She is alert.      Cranial Nerves: Cranial nerves 2-12 are intact.      Sensory: Sensation is intact.      Motor: Motor function is intact.      Coordination: Coordination is intact.      Gait: Gait is intact.      Deep Tendon Reflexes: Reflexes are normal and symmetric.      Comments: Lhermitte's - Spurling's + on the left with left-sided neck radiation to the left trapezius   Psychiatric:         Mood and Affect: Mood normal.         Behavior: Behavior normal.            Assessment  Very pleasant 84 years old with memory challenged complaining of significant left-sided neck pain that radiated to her below the  jaw the lateral aspect of the neck to the trapezius correlate with the C4 nerve root.  There is a note in the chart stated that the patient got great relief from an injection in the past which is logical and because of her memory issue I really hate to place her on prednisone taper dose and it would be easier to give her 1 dose Depo-Medrol was cervical epidural.  In the meantime we will get her involved with the chiropractic care and the physical therapy.           Plan  At least 50% of the visit was involved in the discussion of the options for treatment. We discussed exercises, medication, interventional therapies and surgery. Healthy life style is essential with patient hard work to achieve the wellness. In addition; discussion with the patient and/or family about any of the diagnostic results, impressions and/or recommended diagnostic studies, prognosis, risks and benefits of treatment options, instructions for treatment and/or follow-up, importance of compliance with chosen treatment options, risk-factor reduction, and patient/family education.           Recommended Pool therapy, walking in the pool, at least 3x per week for 30 minutes  Recommend self-directed physical therapy with at least daily exercises for minimum of 20-minute, brochure was handed to the patient  Referral to physical therapy  Referral to chiropractic care  Left C6-7 interlaminar SKIP  Healthy lifestyle and anti-inflammatory diet in addition to weight control discussed with the patient  Alternative chronic pain therapies was discussed, encouraged and information was handed  Return to Clinic after the injection       *Please note this report has been produced using speech recognition software and may contain errors related to that system including grammar, punctuation and spelling as well as words and phrases that may be inappropriate. If there are questions or concerns, please feel free to contact me to clarify.    David Lee MD

## 2024-12-06 NOTE — PROGRESS NOTES
No chief complaint on file.    History of Present Complaint:  The patient was referred to us by Referring Provider: Hilaria Cotter. this is 84 y.o.  female  with a past history of   erosive arthritis on hydroxychloroquine and Plaquenil, cervical spondylosis with radiculopathy worse at C4-5 responded in the past with cervical SKIP presenting with straightening of the cervical lordosis, trapezius muscle spasm    Pain started one month ago .   Pain is better nothing .  Pain is worst , coughing , swallow , or sneezing that radiales into the left side id her chest       The pain is described as sharp .    Prior Pain Therapies:  none  Past surgical history:   none    Employment/disability/litigation: retired housewife  Social history: , Has 3children, 10grandchildren and 2great-grandchildren, Finished high school, and Higher education cosmetology school    Diagnostic studies: MRI studies done yesterday at the [facility]    Opioid Risk Assessment Score 0/26    Vipul Each Box that Applies Female Male   FAMILY HISTORY OF SUBSTANCE ABUSE  Vipul the boxes that applies   Alcohol ?  1    ? 3   Illegal drugs ?  2 ? 3   Rx drugs ?  4 ? 4   PERSONAL HISTORY OF SUBSTNACE ABUSE   Alcohol ?  3 ?  3   Illegal drugs ?  4 ?  4    Rx drugs ?  5 ?  5   Age Between 16-45 years ?  1 ?  1   History of Preadolescent Sexual Abuse ?  3 ?  0   PSYCHOLOGIC DISEASE   ADD, OCD, bipolar, schizophrenia   ?  2 ?  2   Depression ?  1 ?  1   Scoring Totals 0      Scoring (Risk)  0-3 - Low  4-7 - Moderate  8 - High  Opioid Risk Assessment Score 0/26

## 2024-12-06 NOTE — PROGRESS NOTES
History of Present Complaint:  The patient was referred to us by Referring Provider: Becki Coronado, APRN-CNP . this is 84 y.o.  female accompanied by her daughter but her other daughter who is a nurse at [employer] takes care of her medication and health issues with a past history of memory disturbances and getting more forgetful, erosive arthritis on hydroxychloroquine and Plaquenil, cervical spondylosis with radiculopathy worse at C4-5 responded in the past with cervical SKIP presenting with with significant neck pain that radiates to her left therapies and anterior of her mandible to her left shoulder.  The patient wearing Lidoderm patch but is not really helping much with the pain causing restriction of her neck movement.  The patient has no weakness no numbness no tingling no balance issues.  No paralysis no saddle paresthesia no incontinence no red flags        Procedures:   Cervical SKIP in the past with good results    Portions of record reviewed for pertinent issues: active problem list, medication list, allergies, family history, social history, notes from last encounter, encounters, lab results, imaging and other available records.    I have personally reviewed the OARRS report for this patient. This report is scanned into the electronic medical record. I have considered the risks of abuse, dependence, addiction and diversion. It showed: No controlled substance  OPIOID RISK ASSESSMENT SCORE 2/26  Aberrant behavior: Forgetful  My patient has no underlying substance abuse or alcohol abuse and there's no mental health conditions contributing to the patient's pain.        Diagnostic studies:  11/11/2024 cervical x-ray showed degenerative changes with spondylosis and some foraminal stenosis  3/24/2023 CT cervical spine mild scoliosis with degenerative changes      Employment/disability/litigation: Beautician retired housewife  Social History:  her  present with her has 3 children and 10 grandchildren  and 2 great-grandchildren finished high school and cosmetology school denies drinking smoking or use of illicit drugs      Review of Systems   HENT: Negative.     Eyes: Negative.    Respiratory: Negative.     Cardiovascular: Negative.    Gastrointestinal: Negative.    Endocrine: Negative.    Genitourinary: Negative.    Musculoskeletal:  Positive for myalgias and neck pain.   Skin: Negative.    Hematological: Negative.    Psychiatric/Behavioral: Negative.            Physical Exam  Vitals and nursing note reviewed.   Constitutional:       Appearance: Normal appearance.       HENT:      Head: Normocephalic and atraumatic.      Nose: Nose normal.   Eyes:      Extraocular Movements: Extraocular movements intact.      Conjunctiva/sclera: Conjunctivae normal.      Pupils: Pupils are equal, round, and reactive to light.   Cardiovascular:      Rate and Rhythm: Normal rate and regular rhythm.      Pulses: Normal pulses.      Heart sounds: Normal heart sounds.   Pulmonary:      Effort: Pulmonary effort is normal.      Breath sounds: Normal breath sounds.   Abdominal:      General: Abdomen is flat. Bowel sounds are normal.      Palpations: Abdomen is soft.   Musculoskeletal:         General: Tenderness present.   Skin:     General: Skin is warm.   Neurological:      General: No focal deficit present.      Mental Status: She is alert.      Cranial Nerves: Cranial nerves 2-12 are intact.      Sensory: Sensation is intact.      Motor: Motor function is intact.      Coordination: Coordination is intact.      Gait: Gait is intact.      Deep Tendon Reflexes: Reflexes are normal and symmetric.      Comments: Lhermitte's - Spurling's + on the left with left-sided neck radiation to the left trapezius   Psychiatric:         Mood and Affect: Mood normal.         Behavior: Behavior normal.            Assessment  Very pleasant 84 years old with memory challenged complaining of significant left-sided neck pain that radiated to her below the  jaw the lateral aspect of the neck to the trapezius correlate with the C4 nerve root.  There is a note in the chart stated that the patient got great relief from an injection in the past which is logical and because of her memory issue I really hate to place her on prednisone taper dose and it would be easier to give her 1 dose Depo-Medrol was cervical epidural.  In the meantime we will get her involved with the chiropractic care and the physical therapy.           Plan  At least 50% of the visit was involved in the discussion of the options for treatment. We discussed exercises, medication, interventional therapies and surgery. Healthy life style is essential with patient hard work to achieve the wellness. In addition; discussion with the patient and/or family about any of the diagnostic results, impressions and/or recommended diagnostic studies, prognosis, risks and benefits of treatment options, instructions for treatment and/or follow-up, importance of compliance with chosen treatment options, risk-factor reduction, and patient/family education.           Recommended Pool therapy, walking in the pool, at least 3x per week for 30 minutes  Recommend self-directed physical therapy with at least daily exercises for minimum of 20-minute, brochure was handed to the patient  Referral to physical therapy  Referral to chiropractic care  Left C6-7 interlaminar SKIP  Healthy lifestyle and anti-inflammatory diet in addition to weight control discussed with the patient  Alternative chronic pain therapies was discussed, encouraged and information was handed  Return to Clinic after the injection       *Please note this report has been produced using speech recognition software and may contain errors related to that system including grammar, punctuation and spelling as well as words and phrases that may be inappropriate. If there are questions or concerns, please feel free to contact me to clarify.    David Lee MD

## 2024-12-16 ENCOUNTER — HOSPITAL ENCOUNTER (OUTPATIENT)
Dept: PAIN MEDICINE | Facility: CLINIC | Age: 84
Discharge: HOME | End: 2024-12-16
Payer: MEDICARE

## 2024-12-16 VITALS
RESPIRATION RATE: 20 BRPM | DIASTOLIC BLOOD PRESSURE: 77 MMHG | TEMPERATURE: 97.2 F | SYSTOLIC BLOOD PRESSURE: 186 MMHG | OXYGEN SATURATION: 98 % | HEART RATE: 61 BPM

## 2024-12-16 DIAGNOSIS — S46.812S TRAPEZIUS MUSCLE STRAIN, LEFT, SEQUELA: ICD-10-CM

## 2024-12-16 DIAGNOSIS — M19.90 ARTHRITIS: ICD-10-CM

## 2024-12-16 DIAGNOSIS — M47.22 CERVICAL SPONDYLOSIS WITH RADICULOPATHY: ICD-10-CM

## 2024-12-16 PROCEDURE — 2500000004 HC RX 250 GENERAL PHARMACY W/ HCPCS (ALT 636 FOR OP/ED): Performed by: ANESTHESIOLOGY

## 2024-12-16 PROCEDURE — 62321 NJX INTERLAMINAR CRV/THRC: CPT | Performed by: ANESTHESIOLOGY

## 2024-12-16 PROCEDURE — 2550000001 HC RX 255 CONTRASTS: Performed by: ANESTHESIOLOGY

## 2024-12-16 RX ORDER — LIDOCAINE HYDROCHLORIDE 5 MG/ML
INJECTION, SOLUTION INFILTRATION; INTRAVENOUS AS NEEDED
Status: COMPLETED | OUTPATIENT
Start: 2024-12-16 | End: 2024-12-16

## 2024-12-16 RX ORDER — METHYLPREDNISOLONE ACETATE 80 MG/ML
INJECTION, SUSPENSION INTRA-ARTICULAR; INTRALESIONAL; INTRAMUSCULAR; SOFT TISSUE AS NEEDED
Status: COMPLETED | OUTPATIENT
Start: 2024-12-16 | End: 2024-12-16

## 2024-12-16 RX ORDER — LIDOCAINE HYDROCHLORIDE AND EPINEPHRINE 10; 10 UG/ML; MG/ML
INJECTION, SOLUTION INFILTRATION; PERINEURAL AS NEEDED
Status: COMPLETED | OUTPATIENT
Start: 2024-12-16 | End: 2024-12-16

## 2024-12-16 ASSESSMENT — ENCOUNTER SYMPTOMS
OCCASIONAL FEELINGS OF UNSTEADINESS: 1
DEPRESSION: 0
LOSS OF SENSATION IN FEET: 0

## 2024-12-16 ASSESSMENT — PAIN SCALES - GENERAL
PAINLEVEL_OUTOF10: 5 - MODERATE PAIN
PAINLEVEL_OUTOF10: 8

## 2024-12-16 ASSESSMENT — PAIN - FUNCTIONAL ASSESSMENT: PAIN_FUNCTIONAL_ASSESSMENT: 0-10

## 2024-12-29 PROCEDURE — RXMED WILLOW AMBULATORY MEDICATION CHARGE

## 2024-12-31 ENCOUNTER — PHARMACY VISIT (OUTPATIENT)
Dept: PHARMACY | Facility: CLINIC | Age: 84
End: 2024-12-31
Payer: MEDICARE

## 2025-01-06 NOTE — PROGRESS NOTES
"  Patient ID: Ludivina Erwin is a 84 y.o. female who presents for No chief complaint on file..    Referring Provider: Linda Lane  Pt was referred for cost assistance    Preferred Pharmacy:    [facility] - [place], [place] - [address]  [address]  [address]  [facility] [address]  Phone: [phone] Fax: [phone]    [id number] - [place], [place] - [address] [facility]  [address] [facility] [address]  Phone: [phone] Fax: [phone]    [facility] - [place], [place] - [address] [place] [address] [address]  [address] [facility] [address]  [facility] [address]  Phone: [phone] Fax: [phone]      Copay assistance:   Do you have copays on your medications? Yes  Do you have trouble affording your current medications? Yes     Patient Assistance Screening (VAF)    Patient verbally reports monthly or yearly income which is less than 400% federal poverty level   Application for program has been submitted for the following medications:   Estrace vaginal cream  Myrbetriq  Patient has been informed that program team will be reaching out to them to discuss necessary documentation, instructed to answer phone/return voicemail.   Patient aware this process may take up to 6 weeks.   If approved medication must be filled through [facility] pharmacy and may be picked up or mailed to patient.       Subjective      Vaginal Symptoms  Vaginal Dryness: Yes  Recurrent urinary tract infections: Not recent  No results found for: \"ESTRADIOLFRE\", \"PROGESTERONE\", \"ESTRADIOL\", \"FSH\"    Current Treatment:   Estrace vaginal cream applied twice weekly at bedtime    Urinary Symptoms  Medications that may contribute to symptoms:   diuretics, anticholinergics, alpha blockers (doxazosin, prazosin, terazosin), tricyclic antidepressants, antipsychotics (lithium, clozapine), calcium channel blockers (causes bladder to relax and affects ability to empty properly), opioids (impair bladder contraction), antihistamines " "(diphenhydramine, chlorpheniramine), pseudoephedrine, phenylephrine, SGLT2 inhibitors (increases the amount of glucose and fluid excreted through kidneys).   Any history of:   Narrow-angle glaucoma? No  Impaired gastric emptying? No  Urinary retention? Unsure  If yes to any of the above use caution/avoid antimuscarinic medications    If diabetes, blood sugar controlled? No  Frequently of bowel movement? Daily  Tobacco use? No  How many protective undergarments are you utilizing daily? All the time, even with myrbetriq changing twice daily. Wears pads/special pants at night time, rarely waking up wet.      What medications have been tried/stopped?   Trospium - stopped due to concern for memory issues  Current medication? Myrbetriq  When started? About 1 year  Side effects? None  Improvement in symptoms? Yes, with increase to 50mg was helpful.       Cardiovascular Health  The ASCVD Risk score (Imani AGUILAR, et al., 2019) failed to calculate for the following reasons:    The 2019 ASCVD risk score is only valid for ages 40 to 79    Lab Results   Component Value Date    CHOL 177 12/09/2022     Lab Results   Component Value Date    HDL 80.4 12/09/2022     No results found for: \"LDLCALC\"  Lab Results   Component Value Date    TRIG 75 12/09/2022     No components found for: \"CHOLHDL\"        Blood Sugar Balance  Lab Results   Component Value Date    GLUCOSE 92 05/15/2023     No results found for: \"LEPTIN\", \"INSULFAST\", \"GLUF\"      Thyroid  Lab Results   Component Value Date    TSH 2.17 05/15/2023    THYROIDPAB <28 05/15/2023       Iron Status  No results found for: \"IRON\", \"TIBC\", \"FERRITIN\"     Kidney Function  Lab Results   Component Value Date    GFRF 90 05/15/2023    CREATININE 0.56 05/15/2023       Potassium  Lab Results   Component Value Date    K 3.9 05/15/2023        Vitamin D3  No results found for: \"VITD25\"    Current Outpatient Medications on File Prior to Visit   Medication Sig Dispense Refill    acetaminophen " "(TylenoL) 325 mg tablet Take 2 tablets (650 mg) by mouth 4 times a day.      acyclovir (Zovirax) 400 mg tablet Take 1 tablet (400 mg) by mouth 2 times a day as needed.      amLODIPine (Norvasc) 2.5 mg tablet Take 1 tablet (2.5 mg) by mouth once daily. 90 tablet 1    atorvastatin (Lipitor) 10 mg tablet Take 1 tablet (10 mg) by mouth once daily. 90 tablet 1    cholecalciferol (Vitamin D-3) 50 mcg (2,000 unit) capsule Take 1 capsule (50 mcg) by mouth once daily.      ciclopirox (Penlac) 8 % solution       coenzyme Q-10 100 mg capsule Take 1 capsule (100 mg) by mouth once daily.      estradiol (Estrace) 0.01 % (0.1 mg/gram) vaginal cream 1 gram pea size amount 2-3 x per week 42.5 g 3    fluticasone (Flonase) 50 mcg/actuation nasal spray       hydroxychloroquine (Plaquenil) 200 mg tablet Take 1 tablet (200 mg) by mouth once daily. 90 tablet 0    ibuprofen 600 mg tablet Take 1 tablet (600 mg) by mouth every 6 hours.      ipratropium (Atrovent) 42 mcg (0.06 %) nasal spray Administer 2 sprays into each nostril 3 times a day as needed for rhinitis. 15 mL 11    meclizine HCl (MECLIZINE ORAL) Take by mouth.      metoprolol succinate XL (Toprol-XL) 25 mg 24 hr tablet Take 0.5 tablets (12.5 mg) by mouth once daily. 45 tablet 1    sertraline (Zoloft) 50 mg tablet Take 1 tablet (50 mg) by mouth once daily.      vibegron (Gemtesa) 75 mg tablet Take 1 tablet (75 mg) by mouth once daily. 90 tablet 3    vitamin B complex (B Complex-Vitamin B12) tablet Take 1 tablet by mouth once daily.       No current facility-administered medications on file prior to visit.        Medication and allergy reconciliation completed     Drug Interactions   Myrbetriq:hydroxychloroquine: EKG 3/18/24 QT interval 422/QTc 403    Assessment/Plan     Patient was experiencing urinary frequency, urgency, and incontinence. Last visit switched to Gemtesa from Myrbetriq.  shares that she isn't having to \"rush to the bathroom\" anymore. Patient continues to use " estrace vaginal cream twice weekly, no side effects.   Spoke with patients  Joe ([phone]).    Gemtesa: no side effects  Has tried before: trospium, Myrbetriq  Patient approved for [facility] from 8/29/24-8/29/25      LABS  Lab Results   Component Value Date    GFRF 90 05/15/2023           CONTINUE  Gemtessa 75 mg once daily   Estrace vaginal cream, applied vaginally twice weekly    Follow-up: 7/7/25 9:40am     Time spent with pt: Total length of time 10 (minutes) of the encounter and more than 50% was spent counseling the patient.      Codi Pina, Pharm.D, [facility], [facility]  Clinical Pharmacist  Pharmacy Services  [phone]    Continue all meds under the continuation of care with the referring provider and clinical pharmacy team.    Verbal consent to manage patient's drug therapy was obtained from the patient and/or an individual authorized to act on behalf of a patient. They were informed they may decline to participate or withdraw from participation in pharmacy services at any time.

## 2025-01-08 ENCOUNTER — APPOINTMENT (OUTPATIENT)
Dept: PHARMACY | Facility: HOSPITAL | Age: 85
End: 2025-01-08
Payer: MEDICARE

## 2025-01-08 DIAGNOSIS — R35.1 NOCTURIA: ICD-10-CM

## 2025-01-08 DIAGNOSIS — N39.41 URGE INCONTINENCE: Primary | ICD-10-CM

## 2025-01-08 DIAGNOSIS — N95.2 VAGINAL ATROPHY: ICD-10-CM

## 2025-01-14 ENCOUNTER — APPOINTMENT (OUTPATIENT)
Dept: INTEGRATIVE MEDICINE | Facility: CLINIC | Age: 85
End: 2025-01-14
Payer: MEDICARE

## 2025-01-14 DIAGNOSIS — M19.90 ARTHRITIS: ICD-10-CM

## 2025-01-14 DIAGNOSIS — S46.812S TRAPEZIUS MUSCLE STRAIN, LEFT, SEQUELA: ICD-10-CM

## 2025-01-14 DIAGNOSIS — M54.2 CERVICALGIA: ICD-10-CM

## 2025-01-14 DIAGNOSIS — M99.00 SEGMENTAL AND SOMATIC DYSFUNCTION OF HEAD REGION: ICD-10-CM

## 2025-01-14 DIAGNOSIS — M47.22 CERVICAL SPONDYLOSIS WITH RADICULOPATHY: ICD-10-CM

## 2025-01-14 DIAGNOSIS — M99.02 SEGMENTAL AND SOMATIC DYSFUNCTION OF THORACIC REGION: ICD-10-CM

## 2025-01-14 DIAGNOSIS — M99.01 SEGMENTAL AND SOMATIC DYSFUNCTION OF CERVICAL REGION: Primary | ICD-10-CM

## 2025-01-14 PROCEDURE — 98941 CHIROPRACT MANJ 3-4 REGIONS: CPT | Performed by: CHIROPRACTOR

## 2025-01-14 PROCEDURE — 99202 OFFICE O/P NEW SF 15 MIN: CPT | Performed by: CHIROPRACTOR

## 2025-01-14 ASSESSMENT — ENCOUNTER SYMPTOMS
MYALGIAS: 1
SHORTNESS OF BREATH: 0
JOINT SWELLING: 0
HEADACHES: 1
FATIGUE: 0
NECK STIFFNESS: 1
CHEST TIGHTNESS: 0
TROUBLE SWALLOWING: 0
LIGHT-HEADEDNESS: 0
UNEXPECTED WEIGHT CHANGE: 0
VOMITING: 0
NUMBNESS: 0
BACK PAIN: 0
NECK PAIN: 1
DIFFICULTY URINATING: 0
FLANK PAIN: 0
CHILLS: 0
FEVER: 0
ARTHRALGIAS: 0
WEAKNESS: 0
DIZZINESS: 0

## 2025-01-14 NOTE — PROGRESS NOTES
Subjective   Patient ID: Ludivina Erwin is a 84 y.o. female who presents today, January 14, 2025 for a new patient evaluation and treatment of neck pain.    No [facility]-Medicare    Chiropractic Medicine HPI:  Provacative factors include : any head movement  Palliative factors incude : heat, medications, lying flat  Pain is described as : ache, sharp   Previous treatment for complaint has included : heat, injections, NSAIDS, Rx medications, Pain management  Patient denies : trauma/accidents/injuries/falls (last 6 months), numbness/tingling, bladder weakness, bowel weakness, difficulty walking, instability, radiating pain into the distal extremity, catching, clicking, popping  Patient rates severity of pain at : 5/10 on a numerical pain scale  Patient rates least severe pain at : 2/10 on a numerical pain scale  Patient rates most severe pain at : 9/10 on a numerical pain scale   Completed Oswestry Disability Index prior to visit: yes     Ludivina presents for evaluation and treatment of neck pain. Patient states that neck pain began about 3 months ago without injury/trauma/accident. She states that symptoms start at the top of the shoulder and go up to the base of the skull on the left. She states that she has has head pain on the left side of the back of her skull. She denies any radicular symptoms. She states that any movement of the head increases symptoms. She states that ibuprofen, heat, and lying down temporarily reduce symptoms. She states that she had a steroid injection and oral steroids and did not note any improvement in symptoms from them. She states that she hears a cracking noise in her neck every time she moves her head. Patient denies any headaches, difficulty speaking/swallowing, vision changes, bowel/bladder issues, chest pain/shortness of breath, numbness/tingling.          Objective   Review of Systems   Constitutional:  Negative for chills, fatigue, fever and unexpected weight change.   HENT:  Negative  for trouble swallowing.    Eyes:  Negative for visual disturbance.   Respiratory:  Negative for chest tightness and shortness of breath.    Cardiovascular:  Negative for chest pain and leg swelling.   Gastrointestinal:  Negative for vomiting.   Genitourinary:  Negative for difficulty urinating and flank pain.   Musculoskeletal:  Positive for myalgias, neck pain and neck stiffness. Negative for arthralgias, back pain, gait problem and joint swelling.   Neurological:  Positive for headaches. Negative for dizziness, weakness, light-headedness and numbness.   Psychiatric/Behavioral:  Negative for self-injury and suicidal ideas.    All other systems reviewed and are negative.    Physical Exam  Constitutional:       General: She is not in acute distress.     Appearance: Normal appearance.       HENT:      Head: Normocephalic and atraumatic.   Musculoskeletal:      Cervical back: Torticollis, tenderness and crepitus present. Pain with movement present. Decreased range of motion.      Thoracic back: Tenderness present.   Neurological:      General: No focal deficit present.      Mental Status: She is alert and oriented to person, place, and time.      Cranial Nerves: No dysarthria or facial asymmetry.      Sensory: Sensation is intact.      Motor: Motor function is intact.      Coordination: Coordination is intact.      Gait: Gait is intact.   Psychiatric:         Attention and Perception: Attention normal.         Mood and Affect: Mood normal.         Speech: Speech normal.         Behavior: Behavior is cooperative.        Spine Musculoskeletal Exam    Gait    Gait is normal.    Inspection    Shoulder elevation: left    Cervical Spine    Cervical spine inspection additional comments: Forward head carriage and bilateral rounded shoulders.     Palpation    Cervical Spine    Tenderness: present      Paraspinous: right and left      Trapezius: left      Suboccipital triangle: left    Right      Muscle tone: increased    Left       Crepitus: severe      Muscle tone: increased    Range of Motion    Cervical Spine       Cervical flexion: 3-4 finger breadths. Cervical flexion detail: pain.     Cervical extension: reduced extension (76-90%). Cervical extension detail: pain.       Right      Lateral bending: reduced bend (51-75%). Lateral bending detail: pain.       Lateral rotation: reduced rotation (51-75%). Lateral rotation detail: pain.       Left      Lateral bending: reduced bend (51-75%). Lateral bending detail: pain.       Lateral rotation: reduced rotation (51-75%). Lateral rotation detail: pain.      Strength    Cervical Spine    Cervical spine motor exam is normal.    Sensory    Cervical Spine    Cervical spine sensation is normal.    Special Tests    Cervical Spine    Cervical distraction - neck: decreased pain    General    Neurological: alert       Orthopedic Tests:  Cervical: Maximum compression Left and with local pain.    Segmental Joint(s): Segmental joint dysfunction was assessed with motion palpation and is identified in the following areas:  Cervical : C0 C1 C6 C7  Thoracic : T1 and T2.    Assessment/Plan   Today's Treatment Included: Spinal manipulation to the following regions of segmental dysfunction identified on examination, using age-appropriate and injury specific force. Segmental Joint(s) Cervical : C0 C1 C6 C7 Segmental Joint(s) Thoracic : T1 and T2.   Chiropractic manipulation treatment techniques utilized: Diversified CMT and Cervical Manual Traction  Soft tissue mobilization techniques utilized during treatment: Ischemic compression    Suboccipital left, Cervical paraspinal mm. bilateral, Upper Trapezius left, and Levator Scap. bilateral    Patient was shown cervical range of motion and upper trap stretches.    Treatment Plan: The patient and I discussed the risks and benefits of Chiropractic Care.  Consent for care was given both written and orally by the patient.  Based on the patient's subjective complaints  along with the examination findings, it is advised that a course of Chiropractic Treatment by initiated in the form of:   Chiropractic Manipulation (CMT)  Integrative Dry Needing (IDN)  Chiropractic treatment recommended at a frequency of 1 times per week for 4 weeks  until symptoms are mild or manageable  The goals of the treatment will be to: decrease pain, increase activity, increase range of motion, improve quality of life, decrease muscular hypertonicity, and increase functional capacity  The patient tolerated today's treatment with little or no additional discomfort and was instructed to contact the office for questions or concerns.   Follow up as scheduled.

## 2025-01-17 PROBLEM — M47.22 CERVICAL SPONDYLOSIS WITH RADICULOPATHY: Status: ACTIVE | Noted: 2025-01-17

## 2025-01-17 NOTE — PROGRESS NOTES
PHYSICAL THERAPY EVALUATION AND TREATMENT    Patient Name: Ludivina Erwin  MRN: [id number]  Today's Date: 1/20/2025  Time Calculation  Start Time: 1115  Stop Time: 1202  Time Calculation (min): 47 min    Insurance:  Visit number: 1 (updated 01/20/25)  Insurance Type: Payor: MEDICARE / Plan: MEDICARE PART A AND B / Product Type: *No Product type* / $0 APPLIED   Surgery: No surgery found, No surgery found.  Days since surgery: No surgery found   Referred by: David Lee MD     PT Evaluation Time Entry  PT Evaluation (Low) Time Entry: 20  PT Therapeutic Procedures Time Entry  Therapeutic Exercise Time Entry: 27                     Assessment:  PT Assessment  PT Assessment Results: Decreased strength, Decreased range of motion, Decreased mobility, Pain  Rehab Prognosis: Good  Evaluation/Treatment Tolerance: Patient limited by pain  Barriers to Participation: Ability to acquire knowledge, Capable of completing ADLs semi/independent, Insight into problems, Rehab experience     Ludivina Erwin  is a 84 y.o. old patient who participated in a physical therapy evaluation today due to L-sided neck pain/soreness. Ludivina presents with signs and symptoms consistent with L upper trapezius strain. This PT did not observe any signs or symptoms related to radicular pain. Ludivina's impairments include: postural deficits, pain, decreased strength, decreased range of motion, and decreased functional mobility.   Due to these impairments, she has the following functional limitations and participation restrictions: difficulty performing household activities, difficulty performing recreational activities, difficulty with completing/performing some ADLs/IADLs, and increased time to complete ADLs/IADLs. Ludivina's clinical presentation is Stable and/or uncomplicated characteristics with the level of complexity being low. Ludivina's potential for rehab is good.  Skilled physical therapy services are appropriate and beneficial in order to achieve  measurable and meaningful change in the objective tests and measures. Utilization of skilled physical therapy services will aid in advancing her functional status and attaining her therapy-related goals. Ludivina verbalized understanding and is in agreement with all goals and plan of care.  Ludivina left session with all questions answered and no increase in symptoms.      Plan:  OP PT Plan  Treatment/Interventions: Aquatic therapy, Biofeedback, Cryotherapy, Dry needling, Education/ Instruction, Electrical stimulation, Gait training, Hot pack, Manual therapy, Neuromuscular re-education, Self care/ home management, Taping techniques, Therapeutic activities, Therapeutic exercises, Ultrasound  PT Plan: Skilled PT  PT Frequency: 2 times per week  Duration: 6 weeks  Rehab Potential: Good  Plan of Care Agreement: Patient    NV: Assess response to HEP. Initiate manual therapy for pain management/decreased muscle tension. Progress cervical ROM and cervical/postural muscle activation.    Therapy Diagnosis:   Problem List Items Addressed This Visit             ICD-10-CM       Musculoskeletal and Injuries    Strain of trapezius muscle - Primary S46.819A    Relevant Orders    Follow Up In Physical Therapy       Neuro    Cervical spondylosis with radiculopathy M47.22    Relevant Orders    Follow Up In Physical Therapy     Other Visit Diagnoses         Codes    Arthritis     M19.90    Trapezius muscle strain, left, sequela     S46.812S    Relevant Orders    Follow Up In Physical Therapy            Subjective    Ludivina Erwin  is a 84 y.o. female  presenting to the clinic with chief complaint of L neck pain and crepitus in neck with certain movements. Daughter, who pt lives with, was present for session. Daughter reports pt walks with shoulders elevated. Pt was also referred to a chiropractor, who pt recently saw, but daughter reported she intends to cancel further chiropractor appointments and stick with physical therapy to address  "problem for now. Trial an injection recently but this did not improve symptoms.    Onset: 4-5 months ago; daughter reports pt had a similar problem ~15 years ago, but this was addressed with an injection.    PARRIS: unknown    Pain location:  L posterior neck, does not refer to shoulder  Pain description:  sore   10/10 at worst; 5/10 at best    Worse with:  any head movement  Better with:  heat    Denies N/T   Denies radiating pain     Prior treatments/interventions:  chiropractor, heat, injections, NSAIDS, prescription medication, pain management    Home: Lives with daughter and pt's   PLOF: IND  CLOF: Needs to take breaks between tasks and use heating pad to relax shoulders  Functional limitations: requires increased time to complete iADLs/ADLs  Pt's goal: \" get rid of the cracking\"     Work: n/a  Exercise: walking  Hobbies: n/a    Diagnostic imagin24 -  cervical XRAT showed degenerative changes with spondylosis and some foraminal stenosis    Medical History Form: Reviewed (scanned into chart)      Precautions:  Fall Risk: Low    + HTN, medically managed  Denies: CA, pacemaker, DM, blood thinner medication use, latex allergy, epilepsy/seizures, or other known cardio/neuro/pulmonary problems   Denies unexpected weight loss/gain, night sweats, or night pain.    Objective   Outcome Measures:  NDI: 54% or 27/50    Posture: Forward head, rounded shoulders  Transfers: IND  Bed Mobility: IND    Numbness/Tingling/Paresthesia:     Dermatomes B UE:   Upper Trapezius (C4): intact B but diminished on L vs. R  All other UE dermatomes grossly intact with eyes closed to light touch      Cervical AROM (degrees):  Flexion: 11, pain with motion  Extension: 33  SB (R/L): 38 / 55; slight pain with L side-bending  Rotation (R/L): 20 / 30; slight pain with L rotation  Retraction: limited    Shoulder AROM (degrees):  Flexion, Abduction, and Extension all WFL B    Strength/Myotomes: MMT in sitting, * indicates " pain  Shoulder Elevation (C4) R/L: 4+/4+*  Shoulder Abduction (C5) R/L: 4+/4+  Elbow Flexion (C6) R/L: 5/5  Wrist Ext (C6) R/L: 5/5  Elbow Extension (C7) R/L: 5/5  Thumb Ext (C8) R/L: 4-/4-  Hand Intrinsics- abd/add (T1) R/L: 4/4    Palpation: Increased muscle tension observed in B upper traps and cervical paraspinals L > R but pt reported soreness over L side only  Shoulder position: Rounded  Head Position: Forward  Scapular position: protracted    Treatments:    Therapeutic Exercise: 27 minutes  - 1x10 of the following cervical AROM in sitting: extension, R side-bending, R cervical rotation; did not attempt flexion or L side-bending or rotation due to pt report of pain with movement  - 1x30 sec L upper trap stretch  - 1x5 of the following cervical isometrics with 5 sec hold: cervical flexion, L side-bending, and L rotation  [facility] Access Code: [email or web address]     EDUCATION:  Outpatient Education  Individual(s) Educated: Patient, Child  Education Provided: Anatomy, Body Mechanics, Fall Risk, Home Exercise Program, Home Safety, Posture  Risk and Benefits Discussed with Patient/Caregiver/Other: yes  Patient/Caregiver Demonstrated Understanding: yes  Plan of Care Discussed and Agreed Upon: yes  Patient Response to Education: Patient/Caregiver Verbalized Understanding of Information, Patient/Caregiver Asked Appropriate Questions    -In answer to daughter's question, educated pt on proper sleeping positioning due to pt sleeping on her L side. Educated pt to trial placing pillow under RUE and making sure her pillow keeps her neck in a neutral position to prevent further aggravation of muscle soreness  -Educated Ludivina  on the role of PT and PT POC  -Educated Ludivina regarding benefit and purpose of skilled PT services along with results of examination findings and how this correlates to their chief complaint.   -Answered Ludivina's questions in full.  -Educated Ludivina on relevant anatomy and the rationale for exercises  -Ludivina LEE  Rip advised to hold any ex if it increases pain, Ludivina Erwin verbalized understanding    Goals:        STG's (within 2 weeks)  Ludivina Erwin will decrease pain by >/= 2/10 points from baseline to improve ability to perform household/recreational activities.    Ludivina Erwin will demonstrate independence,  excellent understanding of and report compliance with at least 3 exercises in her HEP to facilitate the learning process to maximize PT benefits and to facilitate independent rehab program upon discharge.    LTG's (by discharge)    Ludivina Erwin   will report 50% reduction in tenderness to palpation to indicate healing and to improve household/recreational activities.    Ludivina Erwin   will demonstrate increased functional deep cervical neck flexor strength by sustaining good body mechanics without significant compensation from superficial musculature during simulation of mopping, vacuuming, and lifting objects of the floor in order to enhance functional mobility/ decrease strain on spine with work related activities and housework.    Ludivina Erwin  will increase Neck Disability Index (NDI) score by >/=7 points or 14% (minimal clinically important difference) in order to reflect improvement in ADL's/pain reduction and improve QOL. Baseline 1/20/25: 27/50, 54%    Ludivina Erwin  will demonstrate increase in deep cervical neck flexor strength by sustaining musculature in supine position without pain for >/= 10 seconds without significant compensation from superficial musculature in order to enhance ability to complete ADLs, IADLs, and recreational/ occupational activities.    Ludivina Erwin  will increase cervical mobility to WNL/symmetrical without pain for unlimited ability to perform home household/occupational/recreational tasks.    Ludivina Erwin  will demonstrate independence and report compliance with HEP to facilitate independent rehab program upon discharge for long-term maintenance of  condition and gains from PT POC.    Ludivina Erwin  will demonstrate good posture as noted by requiring < or = 2 postural cues within session, showing knowledge of appropriate spinal alignment to decrease excess strain through the cervical spine to allow for increased ability to perform ADLS (including lifting objects and reaching to high shelves)    Ludivina Erwin will decrease pain to 0-2/10 to improve ability to perform household/recreational activities.    Ludivina Erwin will demonstrate independence,  excellent understanding of and report compliance with HEP to facilitate the learning process to maximize PT benefits and to facilitate independent rehab program upon discharge.

## 2025-01-20 ENCOUNTER — EVALUATION (OUTPATIENT)
Dept: PHYSICAL THERAPY | Facility: CLINIC | Age: 85
End: 2025-01-20
Payer: MEDICARE

## 2025-01-20 DIAGNOSIS — S46.812S STRAIN OF LEFT TRAPEZIUS MUSCLE, SEQUELA: Primary | ICD-10-CM

## 2025-01-20 DIAGNOSIS — M19.90 ARTHRITIS: ICD-10-CM

## 2025-01-20 DIAGNOSIS — M47.22 CERVICAL SPONDYLOSIS WITH RADICULOPATHY: ICD-10-CM

## 2025-01-20 DIAGNOSIS — S46.812S TRAPEZIUS MUSCLE STRAIN, LEFT, SEQUELA: ICD-10-CM

## 2025-01-20 PROCEDURE — 97110 THERAPEUTIC EXERCISES: CPT | Mod: GP

## 2025-01-20 PROCEDURE — 97161 PT EVAL LOW COMPLEX 20 MIN: CPT | Mod: GP

## 2025-01-21 ENCOUNTER — APPOINTMENT (OUTPATIENT)
Dept: INTEGRATIVE MEDICINE | Facility: CLINIC | Age: 85
End: 2025-01-21
Payer: MEDICARE

## 2025-01-21 NOTE — PROGRESS NOTES
Physical Therapy    Physical Therapy Treatment    Patient Name: Ludivina Erwin  MRN: [id number]  Today's Date: 1/22/2025  Time Calculation  Start Time: 0817  Stop Time: 0900  Time Calculation (min): 43 min  Insurance - reviewed   Visit number: 2 (updated 01/22/25)   Payor: MEDICARE / Plan: MEDICARE PART A AND B / Product Type: *No Product type* /    Referring Provider: David Lee MD       Current Problem  Problem List Items Addressed This Visit             ICD-10-CM       Musculoskeletal and Injuries    Strain of trapezius muscle - Primary S46.819A       Neuro    Cervical spondylosis with radiculopathy M47.22     Other Visit Diagnoses         Codes    Trapezius muscle strain, left, sequela     S46.812S              Precautions       General  Subjective      Ludivina Erwin denies any falls or complications since last session. Ludivina Erwin reports experiencing L neck pain intermittently, reports it can be unbearable at times.    Ludivina Erwin reports good compliance with HEP. Reports greatest difficulty with cervical rotation due to pain.    Pain:  0/10  Pain location: N/A      Objective     Treatments:  Abbreviation Key  NC = not completed this visit   NV = next visit  // = parallel    Assigned HEP- Medbridge [id number]     Therapeutic Exercise:  25 minutes    NuStep, BUE/BLE use, level 1, seat 4, subjective and warm-up  1x10 supine chin tucks; additional sets not performed due to pt reporting increase in pain at base of neck with movement  2x10 of the following supine: scapular retraction with arms at side and elbows at 90 degrees, scapular retraction with B shoulders in external rotation, lower trap activation with B arms at sides    Manual Therapy:  18 minutes   Pt in supine:  Cervical distraction, x3 1 min holds  STM/DTM and trigger point release to L upper trap, paraspinals and occipital muscles      Outpatient Education: Reviewed home exercise program. Home exercise program instructed and issued.  Answered questions about home exercise program. Provided manual cues for correct performance of exercises. Provided verbal feedback to improve exercise technique.   Ludivina Erwin verbalizes understanding of all education provided: Yes    Assessment:  Ludivina Erwin completed treatment today which focused upon decreasing muscle tension, increasing ROM, and activating postural muscles in order to address postural, strength, and ROM deficits due to L trapezius muscle strain.  Ludivina presents with increased muscle tension in L sub-occipitals, paraspinals, and upper trap that diminished slightly with manual therapy.  Ludivina requires frequent cueing for exercise technique.  Ludivina was challenged with chin tucks in supine due to the motion increasing pain at the base of her skull.  Ludivina's  response to tx was: Patient tolerated therapeutic interventions well and without any adverse events. Patient educated on maintaining good posture throughout the day to reduce strain on L upper trap .. Ludivina's Progress towards goals:  increased independence in completing exercises. Reduced intensity of pain.. Ludivina Erwin continues to have decreased strength, decreased ROM, and pain limiting participation in household chores, recreational activities, exercise, and sleep. Further skilled therapy services are warranted to address the remaining impairments in order to progress towards functional goals. Ludivina left session with all questions answered and no increase in symptoms.      Plan: Assess response to HEP. Continue manual therapy for pain management. Progress cervical and postural exercises as tolerated.      Time Calculation  Start Time: 0817  Stop Time: 0900  Time Calculation (min): 43 min     PT Therapeutic Procedures Time Entry  Manual Therapy Time Entry: 18  Therapeutic Exercise Time Entry: 25

## 2025-01-22 ENCOUNTER — TREATMENT (OUTPATIENT)
Dept: PHYSICAL THERAPY | Facility: CLINIC | Age: 85
End: 2025-01-22
Payer: MEDICARE

## 2025-01-22 DIAGNOSIS — S46.812S STRAIN OF LEFT TRAPEZIUS MUSCLE, SEQUELA: Primary | ICD-10-CM

## 2025-01-22 DIAGNOSIS — S46.812S TRAPEZIUS MUSCLE STRAIN, LEFT, SEQUELA: ICD-10-CM

## 2025-01-22 DIAGNOSIS — M47.22 CERVICAL SPONDYLOSIS WITH RADICULOPATHY: ICD-10-CM

## 2025-01-22 PROCEDURE — 97140 MANUAL THERAPY 1/> REGIONS: CPT | Mod: GP

## 2025-01-22 PROCEDURE — 97110 THERAPEUTIC EXERCISES: CPT | Mod: GP

## 2025-01-27 ENCOUNTER — TREATMENT (OUTPATIENT)
Dept: PHYSICAL THERAPY | Facility: CLINIC | Age: 85
End: 2025-01-27
Payer: MEDICARE

## 2025-01-27 DIAGNOSIS — S46.812S TRAPEZIUS MUSCLE STRAIN, LEFT, SEQUELA: ICD-10-CM

## 2025-01-27 DIAGNOSIS — M47.22 CERVICAL SPONDYLOSIS WITH RADICULOPATHY: ICD-10-CM

## 2025-01-27 DIAGNOSIS — S46.812S STRAIN OF LEFT TRAPEZIUS MUSCLE, SEQUELA: ICD-10-CM

## 2025-01-27 PROCEDURE — 97140 MANUAL THERAPY 1/> REGIONS: CPT | Mod: GP

## 2025-01-27 NOTE — PROGRESS NOTES
Physical Therapy    Physical Therapy Treatment    Patient Name: Ludivina Erwin  MRN: [id number]  Today's Date: 1/27/2025  Time Calculation  Start Time: 1200  Stop Time: 1245  Time Calculation (min): 45 min  Insurance - reviewed   Visit number: 3 (updated 01/27/25)   Payor: MEDICARE / Plan: MEDICARE PART A AND B / Product Type: *No Product type* /    Medicare Certification Period: 1/20/25 - 4/20/25  Referring Provider: David Lee MD       Current Problem  Problem List Items Addressed This Visit             ICD-10-CM       Musculoskeletal and Injuries    Strain of trapezius muscle S46.819A       Neuro    Cervical spondylosis with radiculopathy M47.22     Other Visit Diagnoses         Codes    Trapezius muscle strain, left, sequela     S46.812S                Precautions       General  Subjective      Ludivina Erwin denies any falls or complications since last session. Ludivina Erwin reports increased soreness in neck today and with the home exercises. Continues to have difficulty turning her head without pain.    Ludivina Erwin reports good compliance with HEP    Pain:  7-8/10  Pain location: L base of neck      Objective     Treatments:  Abbreviation Key  NC = not completed this visit   NV = next visit  // = parallel    Assigned HEP- Medbridge [id number]     Therapeutic Exercise:  5 minutes    NuStep, BUE/BLE use, level 4, seat 5, subjective and warm-up    Manual Therapy:  40 minutes   Pt seated:  Myofasical cupping to L paraspinals, upper trap, and L SCM  DTM and trigger point release to L paraspinals and L SCM  Fanning strip of kinesiotape applied to L upper trap and singular strip of kinesiotape applied to L paraspinals      Outpatient Education: Educated pt on the purpose and benefits of kinesiotape. Educated on signs and symptoms to monitor for in case of irritation/allergic reaction, such as redness and itching. Pt advised to leave the kinesiotape as along as possible and how to properly remove. Pt was  agreeable to kinesiotape.  Ludivina Erwin verbalizes understanding of all education provided: Yes    Assessment:  Ludivina Erwin completed treatment today which focused upon decreasing muscle tension and pain management to address increased pain in L trapezius muscle.  Ludivina presents with increased muscle tension and tenderness in L paraspinals, SCM, and upper trap. Muscle tension in SCM and upper trap diminished with use of myofascial cupping and trigger point release. Utilized kinesiotape this session in attempt to reduce L upper trap and paraspinal overactivation and irritation.   Ludivina's  response to tx was: reduced pain following treatment. Ludivina's Progress towards goals:  increased independence in completing exercises. Reduced intensity of pain.. Ludivina Erwin continues to have decreased strength, decreased ROM, and pain limiting participation in household chores, recreational activities, exercise, and sleep. Further skilled therapy services are warranted to address the remaining impairments in order to progress towards functional goals. Ludivina left session with all questions answered and no increase in symptoms.      Plan: Assess response to kinesiotape. Continue manual therapy for pain management and attempt progression of neck/postural muscles as able.      Time Calculation  Start Time: 1200  Stop Time: 1245  Time Calculation (min): 45 min     PT Therapeutic Procedures Time Entry  Manual Therapy Time Entry: 40  Therapeutic Exercise Time Entry: 5

## 2025-01-28 ENCOUNTER — APPOINTMENT (OUTPATIENT)
Dept: INTEGRATIVE MEDICINE | Facility: CLINIC | Age: 85
End: 2025-01-28
Payer: MEDICARE

## 2025-01-29 ENCOUNTER — TELEPHONE (OUTPATIENT)
Dept: UROLOGY | Facility: CLINIC | Age: 85
End: 2025-01-29
Payer: MEDICARE

## 2025-01-29 DIAGNOSIS — M06.00 SERONEGATIVE RHEUMATOID ARTHRITIS (MULTI): ICD-10-CM

## 2025-01-29 DIAGNOSIS — I10 PRIMARY HYPERTENSION: ICD-10-CM

## 2025-01-29 NOTE — PROGRESS NOTES
Physical Therapy    Physical Therapy Treatment    Patient Name: Ludivina Erwin  MRN: [id number]  Today's Date: 1/30/2025  Time Calculation  Start Time: 0846  Stop Time: 0931  Time Calculation (min): 45 min  Insurance - reviewed   Visit number: 4 (updated 01/30/25)   Payor: MEDICARE / Plan: MEDICARE PART A AND B / Product Type: *No Product type* /    Medicare Certification Period: 1/20/25 - 4/20/25   Referring Provider: David Lee MD       Current Problem  Problem List Items Addressed This Visit             ICD-10-CM    Strain of trapezius muscle - Primary S46.819A    Cervical spondylosis with radiculopathy M47.22     Other Visit Diagnoses         Codes    Trapezius muscle strain, left, sequela     S46.812S              Precautions: Fall Risk: Low    + HTN, medically managed  Denies: CA, pacemaker, DM, blood thinner medication use, latex allergy, epilepsy/seizures, or other known cardio/neuro/pulmonary problems   Denies unexpected weight loss/gain, night sweats, or night pain.       General  Subjective      Ludivina Erwin denies any falls or complications since last session. Ludivina Erwin reports soreness and pain in L neck with yesterday being the most painful. Continues to have difficulty sleeping on that side. Unsure if the kinesiotape helped relieve pain but the manual therapy last session gave her ~a day relief. Kinesiotape still donned on L shoulder and neck upon arrival.  present for session.     Ludivina Erwin reports fair compliance with HEP. Continues to report exercises can sometimes increase pain or soreness.    Pain:  3/10  Pain location: L upper trap      Objective     Treatments:  Abbreviation Key  NP = not performed this visit   NV = next visit  // = parallel    Assigned HEP- Medbridge [id number]     Therapeutic Exercise:  37 minutes    NuStep, BUE/BLE use, level 5, seat 5, for 5 min warm-up and subjective  Pt completed the following in supine with TENS unit donned and active:  1x10 chin  tucks (added to HEP)  1x10 each way active cervical rotation (added to HEP)  1x10 each way active/active-assisted cervical side-bending (added to HEP)  1x10 B scapular retraction; reported pain in base of her neck following movement  1x10 B lower trap activation with 5 sec hold (added to HEP)  1x10 R side-bending isometric with 5 sec hold (added to HEP)  *Removed all seated cervical ROM, isometrics, and scapular retraction in supine/sitting from HEP due to pt continually reporting increased pain/soreness at sessions    Modalities:       Electrical Stimulation Attended 8  minutes   TENS unit: 4 circular pads applied in square surrounding L paraspinals. Ch1 turned up to 23 Hz and Ch2 to 7 Hz. 8 min for education of TENS benefits, set-up, and tear-down. Stimulation on for 15 min total. Skin observed following use and found minimal, blanchable redness      Outpatient Education: Home exercise program instructed and issued. Answered questions about home exercise program. Provided manual cues for correct performance of exercises. Provided verbal feedback to improve exercise technique. Educated patient on the use and benefits of TENS unit as well as monitoring her skin at home in case minimal redness persists.  Ludivina Erwin verbalizes understanding of all education provided: Yes    Assessment:  Ludivina Erwin completed treatment today which focused upon modality application and regression of strengthening and ROM exercises in order to address pain in L trapezius muscle and decreased cervical ROM and strength.  Ludivina presents with decreased pain when completing cervical ROM and isometrics in supine with use of TENS unit.  Reported decreased pain in L upper trap following use but residual muscle soreness. Ludivina requires extra verbal and tactile cueing to appropriately complete exercises, especially cervical side-bending.  Ludivina was challenged with B scapular retraction due to decreased understanding of the movement and  increased pain in neck following completion.  Ludivina's  response to tx was: Reduced intensity of exercise due to reports of pain when completing movements in sitting Reduced pain post treatment. Improved tolerance to strengthening progressions. Patient required increased cuing and demonstrations for exercises which increases time required for interventions. Ludivina's Progress towards goals: Patient reports there has not been a significant change in functional abilities. Ludivina Erwin continues to have decreased strength, decreased ROM, and pain limiting participation in household chores, recreational activities, exercise, and sleep Further skilled therapy services are warranted to address the remaining impairments in order to progress towards functional goals. Ludivina left session with all questions answered and no increase in symptoms.      Plan: Monitor response to TENS and updated HEP. Continue use of TENS while completing exercises in supine. Potentially progress to seated exercises. Consider referring to dry needling if pain and restriction persists.      Time Calculation  Start Time: 0846  Stop Time: 0931  Time Calculation (min): 45 min     PT Therapeutic Procedures Time Entry  Therapeutic Exercise Time Entry: 37  PT Modalities Time Entry  E-Stim (Attended) Time Entry: 8

## 2025-01-30 ENCOUNTER — TREATMENT (OUTPATIENT)
Dept: PHYSICAL THERAPY | Facility: CLINIC | Age: 85
End: 2025-01-30
Payer: MEDICARE

## 2025-01-30 DIAGNOSIS — S46.812S TRAPEZIUS MUSCLE STRAIN, LEFT, SEQUELA: ICD-10-CM

## 2025-01-30 DIAGNOSIS — M47.22 CERVICAL SPONDYLOSIS WITH RADICULOPATHY: ICD-10-CM

## 2025-01-30 DIAGNOSIS — S46.812S STRAIN OF LEFT TRAPEZIUS MUSCLE, SEQUELA: Primary | ICD-10-CM

## 2025-01-30 PROCEDURE — 97110 THERAPEUTIC EXERCISES: CPT | Mod: GP

## 2025-01-30 PROCEDURE — 97032 APPL MODALITY 1+ESTIM EA 15: CPT | Mod: GP

## 2025-01-30 RX ORDER — HYDROXYCHLOROQUINE SULFATE 200 MG/1
200 TABLET, FILM COATED ORAL DAILY
Qty: 90 TABLET | Refills: 0 | Status: SHIPPED | OUTPATIENT
Start: 2025-01-30

## 2025-01-30 RX ORDER — AMLODIPINE BESYLATE 2.5 MG/1
2.5 TABLET ORAL DAILY
Qty: 90 TABLET | Refills: 0 | Status: SHIPPED | OUTPATIENT
Start: 2025-01-30

## 2025-01-30 RX ORDER — METOPROLOL SUCCINATE 25 MG/1
12.5 TABLET, EXTENDED RELEASE ORAL DAILY
Qty: 45 TABLET | Refills: 0 | Status: SHIPPED | OUTPATIENT
Start: 2025-01-30

## 2025-01-30 NOTE — PROGRESS NOTES
Physical Therapy    Physical Therapy Treatment    Patient Name: Ludivina Erwin  MRN: [id number]  Today's Date: 2/3/2025  Time Calculation  Start Time: 1035  Stop Time: 1114  Time Calculation (min): 39 min  Insurance - reviewed   Visit number: 5 (updated 02/03/25)   Payor: MEDICARE / Plan: MEDICARE PART A AND B / Product Type: *No Product type* /    Medicare Certification Period: 1/20/25 - 4/20/25   Referring Provider: David Lee MD       Current Problem  Problem List Items Addressed This Visit             ICD-10-CM    Strain of trapezius muscle S46.819A    Cervical spondylosis with radiculopathy M47.22     Other Visit Diagnoses         Codes    Trapezius muscle strain, left, sequela     S46.812S              Precautions: Fall Risk: Low    + HTN, medically managed  Denies: CA, pacemaker, DM, blood thinner medication use, latex allergy, epilepsy/seizures, or other known cardio/neuro/pulmonary problems   Denies unexpected weight loss/gain, night sweats, or night pain.       General  Subjective      Ludivina Erwin denies any falls or complications since last session. Ludivina Erwin reports continued soreness/pain in L upper trap. Reports performing her usual routine at home without a significant increase in activity    Ludivina Erwin reports poor compliance with HEP. Pt reports completing the seated exercises that were discontinued and not attempting the supine exercises.    Pain:  8/10  Pain location: L upper trap      Objective     Treatments:  Abbreviation Key  NP = not performed this visit   NV = next visit  // = parallel    Assigned HEP- Medbridge [id number]     Therapeutic Exercise:  15 minutes    NuStep, level 4, seat 4, BUE/BLE use for 5 min warm-up and subjective  1x10 BUE lower trap activation with 5 sec hold in supine (HEP review)  1x20 each direction cervical rotation and side-bending AROM in supine (HEP review)  1x20 supine chin tucks (HEP review)  1x10 R cervical side-bending isometric with 5 sec  hold in supine (HEP review)    Manual Therapy:  24 minutes   Pt in supine:  2x30 sec sub-occipital release  DTM and trigger point release to L upper trap  Pt in sitting:   Applied 1 strip of kinesiotape along length of L upper trap with ~25% tension        Outpatient Education: Reviewed home exercise program. Home exercise program re instructed. Answered questions about home exercise program. Provided manual cues for correct performance of exercises. Provided verbal feedback to improve exercise technique.   Ludivina Erwin verbalizes understanding of all education provided: Yes    Assessment:  Ludivina Erwin completed treatment today which focused upon manual therapy and reviewing HEP in order to address pain associated with L upper trap strain.  Ludivina presents with increased muscle tension and tenderness to palpation in L upper trap which reduced slightly with manual therapy.  Ludivina requires review and education on supine HEP due to her completing the seated HEP. Daughter observed pt's completion of exercises in order to help pt perform the correct ones at home.  Ludivina was challenged with  remembering the HEP and performing exercises without cueing due to slight memory deficits.  Ludivina's  response to tx was: Patient tolerated therapeutic interventions well and without any adverse events. No change in pain. Ludivina's Progress towards goals: Patient reports there has not been a significant change in functional abilities. Ludivina Erwin continues to have decreased strength, decreased ROM, and pain limiting participation in household chores, recreational activities, exercise, and sleep Further skilled therapy services are warranted to address the remaining impairments in order to progress towards functional goals. Ludivina left session with all questions answered and no increase in symptoms.      Plan: Assess response to kinesiotape. Utilize TENS unit with postural and cervical exercises as needed. Consider referring to  DN in the future.      Time Calculation  Start Time: 1035  Stop Time: 1114  Time Calculation (min): 39 min     PT Therapeutic Procedures Time Entry  Manual Therapy Time Entry: 24  Therapeutic Exercise Time Entry: 15

## 2025-02-03 ENCOUNTER — APPOINTMENT (OUTPATIENT)
Dept: PAIN MEDICINE | Facility: CLINIC | Age: 85
End: 2025-02-03
Payer: MEDICARE

## 2025-02-03 ENCOUNTER — TREATMENT (OUTPATIENT)
Dept: PHYSICAL THERAPY | Facility: CLINIC | Age: 85
End: 2025-02-03
Payer: MEDICARE

## 2025-02-03 VITALS
WEIGHT: 140 LBS | RESPIRATION RATE: 10 BRPM | BODY MASS INDEX: 24.8 KG/M2 | HEIGHT: 63 IN | HEART RATE: 64 BPM | SYSTOLIC BLOOD PRESSURE: 153 MMHG | DIASTOLIC BLOOD PRESSURE: 70 MMHG | OXYGEN SATURATION: 95 % | TEMPERATURE: 95.5 F

## 2025-02-03 DIAGNOSIS — S46.812S TRAPEZIUS MUSCLE STRAIN, LEFT, SEQUELA: ICD-10-CM

## 2025-02-03 DIAGNOSIS — M50.30 DEGENERATION OF INTERVERTEBRAL DISC OF CERVICAL REGION: ICD-10-CM

## 2025-02-03 DIAGNOSIS — M79.18 MYOFASCIAL PAIN SYNDROME, CERVICAL: Primary | ICD-10-CM

## 2025-02-03 DIAGNOSIS — S46.812S STRAIN OF LEFT TRAPEZIUS MUSCLE, SEQUELA: ICD-10-CM

## 2025-02-03 DIAGNOSIS — M47.22 CERVICAL SPONDYLOSIS WITH RADICULOPATHY: ICD-10-CM

## 2025-02-03 PROCEDURE — 97110 THERAPEUTIC EXERCISES: CPT | Mod: GP

## 2025-02-03 PROCEDURE — 1157F ADVNC CARE PLAN IN RCRD: CPT | Performed by: NURSE PRACTITIONER

## 2025-02-03 PROCEDURE — 3077F SYST BP >= 140 MM HG: CPT | Performed by: NURSE PRACTITIONER

## 2025-02-03 PROCEDURE — 3078F DIAST BP <80 MM HG: CPT | Performed by: NURSE PRACTITIONER

## 2025-02-03 PROCEDURE — 99214 OFFICE O/P EST MOD 30 MIN: CPT | Performed by: NURSE PRACTITIONER

## 2025-02-03 PROCEDURE — 97140 MANUAL THERAPY 1/> REGIONS: CPT | Mod: GP

## 2025-02-03 PROCEDURE — 1036F TOBACCO NON-USER: CPT | Performed by: NURSE PRACTITIONER

## 2025-02-03 PROCEDURE — 1159F MED LIST DOCD IN RCRD: CPT | Performed by: NURSE PRACTITIONER

## 2025-02-03 PROCEDURE — 1125F AMNT PAIN NOTED PAIN PRSNT: CPT | Performed by: NURSE PRACTITIONER

## 2025-02-03 RX ORDER — LIDOCAINE 50 MG/G
1 PATCH TOPICAL DAILY
Qty: 30 PATCH | Refills: 11 | Status: SHIPPED | OUTPATIENT
Start: 2025-02-03

## 2025-02-03 ASSESSMENT — PAIN DESCRIPTION - DESCRIPTORS: DESCRIPTORS: STABBING;THROBBING

## 2025-02-03 ASSESSMENT — PATIENT HEALTH QUESTIONNAIRE - PHQ9
SUM OF ALL RESPONSES TO PHQ9 QUESTIONS 1 & 2: 0
2. FEELING DOWN, DEPRESSED OR HOPELESS: NOT AT ALL
1. LITTLE INTEREST OR PLEASURE IN DOING THINGS: NOT AT ALL

## 2025-02-03 ASSESSMENT — PAIN - FUNCTIONAL ASSESSMENT: PAIN_FUNCTIONAL_ASSESSMENT: 0-10

## 2025-02-03 ASSESSMENT — ENCOUNTER SYMPTOMS
NECK PAIN: 1
NECK STIFFNESS: 1
CHEST TIGHTNESS: 0
NAUSEA: 0
WHEEZING: 0
OCCASIONAL FEELINGS OF UNSTEADINESS: 1
LOSS OF SENSATION IN FEET: 0
DEPRESSION: 0
DIZZINESS: 0
NUMBNESS: 0
DIARRHEA: 0
SHORTNESS OF BREATH: 0
CONFUSION: 0
PALPITATIONS: 0
AGITATION: 0
FREQUENCY: 0
CONSTIPATION: 0
FATIGUE: 0
CHILLS: 0

## 2025-02-03 ASSESSMENT — PAIN SCALES - GENERAL
PAINLEVEL_OUTOF10: 7
PAINLEVEL_OUTOF10: 7

## 2025-02-03 NOTE — PROGRESS NOTES
Chief Complain  Follow-up for cervical pain radiating to left trapezius region    History Of Present Illness  Ludivina Erwin is a 84 y.o. female here for cervical pain radiating to left trapezius region. The patient rates the pain at 5-6  on a scale from 0-10.  The patient describes pain as sharp, aching, throbbing.  The pain is worsened by prolonged sitting and neck movements and sleeping  and is alleviated by medications nonsteroidal anti-inflammatory drugs and Tylenol, position change, and sitting.  Since the last visit the pain has stayed the same.    The patient denies any fever, chills, weight loss, weakness, bladder/ bowel incontinence, history of cancer, history of IV drug abuse, recent trauma.     Prior office visit:  12/6/2024 Dr. Lee  The patient was referred to us by Referring Provider: ANTONIETA Johnson-CNP . this is 84 y.o.  female accompanied by her daughter but her other daughter who is a nurse at [employer] takes care of her medication and health issues with a past history of memory disturbances and getting more forgetful, erosive arthritis on hydroxychloroquine and Plaquenil, cervical spondylosis with radiculopathy worse at C4-5 responded in the past with cervical SKIP presenting with with significant neck pain that radiates to her left therapies and anterior of her mandible to her left shoulder.  The patient wearing Lidoderm patch but is not really helping much with the pain causing restriction of her neck movement.  The patient has no weakness no numbness no tingling no balance issues.  No paralysis no saddle paresthesia no incontinence no red flag     Procedures:  5/6/2024 left C6-C7 cervical epidural steroid injection the patient has had a 30% improvement in pain and function for roughly 2 to 3 days      Portions of record reviewed for pertinent issues: active problem list, medication list, allergies, family history, social history, notes from last encounter, encounters, lab results, imaging  and other available records.      I have personally reviewed the [facility] report for this patient. This report is scanned into the electronic medical record. I have considered the risks of abuse, dependence, addiction and diversion. It showed: No chronic opioid use.  Past Medical History  She has a past medical history of High blood pressure, HL (hearing loss), Mastodynia (04/07/2015), Personal history of other diseases of the circulatory system, Personal history of other diseases of the musculoskeletal system and connective tissue, Personal history of other endocrine, nutritional and metabolic disease, Personal history of other mental and behavioral disorders, Personal history of other specified conditions (04/07/2015), and Systemic lupus erythematosus, unspecified.    Surgical History  She has a past surgical history that includes Hysterectomy (08/28/2013); Gallbladder surgery (08/28/2013); Breast biopsy (08/28/2013); Other surgical history (02/28/2019); and Other surgical history (06/22/2022).     Social History  She reports that she has never smoked. She has never used smokeless tobacco. She reports that she does not drink alcohol and does not use drugs.    Family History  Family History   Problem Relation Name Age of Onset    Prostate cancer Father      Breast cancer Paternal Grandmother      Hypertension Other      Heart disease Other          Allergies  Duloxetine, Pregabalin, Povidone-iodine, and Tramadol    Review of Systems  Review of Systems   Constitutional:  Negative for chills and fatigue.   Respiratory:  Negative for chest tightness, shortness of breath and wheezing.    Cardiovascular:  Negative for chest pain and palpitations.   Gastrointestinal:  Negative for constipation, diarrhea and nausea.   Genitourinary:  Negative for frequency and urgency.   Musculoskeletal:  Positive for neck pain and neck stiffness.        Neck pain radiates to bilateral upper trapezius region left>right side, no numbness and  "tingling    Neurological:  Negative for dizziness and numbness.   Psychiatric/Behavioral:  Negative for agitation, confusion and suicidal ideas.         Physical Exam  Physical Exam  Constitutional:       General: She is not in acute distress.     Appearance: Normal appearance.          Comments: Cervical decreased range of motion with flexion extension lateral bending and rotation.  Tenderness to palpation of bilateral trapezius cervical splenis, bilateral upper extremities 5 strength, bilateral upper extremities sensation intact.  Positive left Spurling.   HENT:      Head: Normocephalic.   Eyes:      Extraocular Movements: Extraocular movements intact.   Musculoskeletal:         General: Tenderness present.      Cervical back: Tenderness present. Pain with movement and muscular tenderness present. Decreased range of motion.   Neurological:      General: No focal deficit present.      Mental Status: She is alert and oriented to person, place, and time.      GCS: GCS eye subscore is 4. GCS verbal subscore is 5. GCS motor subscore is 6.      Cranial Nerves: Cranial nerves 2-12 are intact.      Sensory: Sensation is intact.      Motor: Motor function is intact.      Coordination: Coordination is intact.      Gait: Gait is intact.      Deep Tendon Reflexes:      Reflex Scores:       Tricep reflexes are 3+ on the right side and 3+ on the left side.       Bicep reflexes are 3+ on the right side and 3+ on the left side.  Psychiatric:         Mood and Affect: Mood normal.         Behavior: Behavior normal.         Thought Content: Thought content normal.         Last Recorded Vitals  Blood pressure 153/70, pulse 64, temperature 35.3 °C (95.5 °F), temperature source Temporal, resp. rate 10, height 1.588 m (5' 2.5\"), weight 63.5 kg (140 lb), SpO2 95%.    Reviewed Images  11/11/2024 cervical x-ray showed degenerative changes with spondylosis and some foraminal stenosis  3/24/2023 CT cervical spine mild scoliosis with " degenerative change    Reviewed Labs  Lab Results   Component Value Date    GLUCOSE 92 05/15/2023    CALCIUM 9.6 05/15/2023     05/15/2023    K 3.9 05/15/2023    CO2 25 05/15/2023     05/15/2023    BUN 12 05/15/2023    CREATININE 0.56 05/15/2023         Assessment/Plan     Ludivina Erwin is a very pleasant 84 y.o. 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  She received left C6-7 intralaminar SKIP which provided roughly 30% relief for 2 to 3 days and then symptoms returned to baseline.  He continues to have cervical pain with basic movements and difficulty with sleeping.  She is currently in physical therapy and receives chiropractic manipulations with slightly improved symptoms and improve her range of motion.  She is managing her symptoms with Tylenol 650 2-3 times per per day which provide moderate relief of symptoms and allow her to complete her ADLs.  On physical examination she had bilateral trapezius myofascial adhesions and decreased range of motion with all movements in the cervical spine.  Bilateral upper extremity vibratory sensation and reflexes intact.  Bilateral upper extremity strength intact equally.  She denies any numbness tingling radiating from her neck to her hands she denies dropping any objects or weaknesses in  strength.  Plan to treat myofascial adhesions with trigger point injections in the cervical region.  Provided referral for massage therapy plan to continue with Tylenol as prescribed.  Provided orders for lidocaine patches to apply to cervical and trapezius region.  Encouraged to continue with physical therapy and chiropractic manipulations.  Patient and daughter verbalized understanding plan of  care.  All questions and concerns answered.  Plan  At least 50% of the visit was involved in the discussion of the options for treatment. We discussed exercises, medication, interventional therapies and surgery. Healthy life style is essential with patient hard work to achieve the wellness. In addition; discussion with the patient and/or family about any of the diagnostic results, impressions and/or recommended diagnostic studies, prognosis, risks and benefits of treatment options, instructions for treatment and/or follow-up, importance of compliance with chosen treatment options, risk-factor reduction, and patient/family education.     Continue with Chiropractic manipulations  Continue with physical therapy as prescribed.  Provided order for massage therapy for cervical myofascial adhesion  Continue self-directed physical therapy  Provided orders for lidocaine patches  Plan for cervical trigger point injections for myofascial adhesions  Healthy lifestyle and anti-inflammatory diet in addition to weight control discussed with the patient  Alternative chronic pain therapies was discussed, encouraged and information was handed  Return to Clinic 6 to 8 weeks after trigger point injections     *Please note this report has been produced using speech recognition software and may contain errors related to that system including grammar, punctuation and spelling as well as words and phrases that may be inappropriate. If there are questions or concerns, please feel free to contact me to clarify.      I spent 38 minutes in the professional and overall care of this patient.       Yasmany Meehan, ANTONIETA-CNP

## 2025-02-06 ENCOUNTER — TREATMENT (OUTPATIENT)
Dept: PHYSICAL THERAPY | Facility: CLINIC | Age: 85
End: 2025-02-06
Payer: MEDICARE

## 2025-02-06 DIAGNOSIS — S46.812S TRAPEZIUS MUSCLE STRAIN, LEFT, SEQUELA: ICD-10-CM

## 2025-02-06 DIAGNOSIS — M47.22 CERVICAL SPONDYLOSIS WITH RADICULOPATHY: ICD-10-CM

## 2025-02-06 DIAGNOSIS — S46.812S STRAIN OF LEFT TRAPEZIUS MUSCLE, SEQUELA: Primary | ICD-10-CM

## 2025-02-06 PROCEDURE — 97032 APPL MODALITY 1+ESTIM EA 15: CPT | Mod: GP

## 2025-02-06 PROCEDURE — 97140 MANUAL THERAPY 1/> REGIONS: CPT | Mod: GP

## 2025-02-06 PROCEDURE — 97110 THERAPEUTIC EXERCISES: CPT | Mod: GP

## 2025-02-06 NOTE — PROGRESS NOTES
Physical Therapy    Physical Therapy Treatment    Patient Name: Ludivina Erwin  MRN: [id number]  Today's Date: 2/6/2025  Time Calculation  Start Time: 0930  Stop Time: 1010  Time Calculation (min): 40 min    Current Problem  Problem List Items Addressed This Visit             ICD-10-CM    Strain of trapezius muscle - Primary S46.819A    Cervical spondylosis with radiculopathy M47.22     Other Visit Diagnoses         Codes    Trapezius muscle strain, left, sequela     S46.812S            Insurance - reviewed:  Visit number: 6 (updated 02/06/25)  Insurance Type: Payor: MEDICARE / Plan: MEDICARE PART A AND B / Product Type: *No Product type* /   Medicare Certification Period: 1/20/25 - 4/20/25   Surgery: No surgery found, No surgery found.  Days since surgery: No surgery found   Referred by: David Lee MD     Precautions: Fall Risk: Low    + HTN, medically managed  Denies: CA, pacemaker, DM, blood thinner medication use, latex allergy, epilepsy/seizures, or other known cardio/neuro/pulmonary problems   Denies unexpected weight loss/gain, night sweats, or night pain.       General  Subjective      Ludivina Erwin denies any falls or complications since last session. Ludivina Erwin reports being in pain all the time at home despite not performing excessive activity. Reports neck is very sore today.    Ludivina Erwin reports good compliance with HEP.    Pain:  7-8/10  Pain location: L neck      Objective     Treatments:  Abbreviation Key  NP = not performed this visit   NV = next visit  // = parallel    Assigned HEP- Medbridge [id number]     Therapeutic Exercise:  15 minutes    NuStep level 4, seat 4, BUE/BLE use for 3 min subjective and warm-up  Performed with TENS unit on:  1x10 cervical extension isometric against pillow in supine with 5 sec hold  2x10 supine chin tucks  1x10 seated thoracic flexion and extension    Manual Therapy:  15 minutes   Central PA mobilizations to C4-7 and T1-6 with pt in prone and pillow  under stomach (TENS unit on)    Modalities:       Electrical Stimulation Attended  10        minutes   TENS unit: 4 circular pads applied in square surrounding L upper trapezius. Ch1 turned up to 12 Hz and Ch2 to 10 Hz. 10 min for education of TENS benefits, set-up, and tear-down. Stimulation on for 25 min total. Skin observed following use and found no changes.      Outpatient Education: Reviewed home exercise program. Provided manual cues for correct performance of exercises. Provided verbal feedback to improve exercise technique.   Ludivina Erwin verbalizes understanding of all education provided: Yes    Assessment:  Ludivina Erwin completed treatment today which focused upon pain modulation, improving cervical/thoracic ROM, and improving in order to address increased pain in L upper trap.  Ludivina presents with increased muscle tension in L upper trap and poor tolerance to exercises in supine and seated positions. Ludivina also presents with decreased mobility in C5-7 and T1-6. This PT had a difficult time accessing the upper cervical spine in prone due to the significance of the pt's forward head posture. Ludivina requires education on proper neck and shoulder posture to maintain throughout the day and continued education on potential reasons for her pain due to her memory deficits.  Ludivina was challenged with completing thoracic flexion and extension exercise with manual/tactile cueing. Ludivina had improved tolerance to supine chin tucks without the addition of a hold.  Ludivina's  response to tx was: Patient required increased cuing and demonstrations for exercises which increases time required for interventions. Ludivina's Progress towards goals: Patient reports there has not been a significant change in functional abilities. Ludivina Erwin continues to have decreased strength, decreased ROM, and pain limiting participation in household chores, recreational activities, exercise, sleep, and attending medical appointments  Further skilled therapy services are warranted to address the remaining impairments in order to progress towards functional goals. Ludivina left session with all questions answered and no increase in symptoms.      Plan: Manual therapy for pain management. Postural exercises and cervical ROM as robyn. Joint mobilizations to cervical and thoracic spine.      Time Calculation  Start Time: 0930  Stop Time: 1010  Time Calculation (min): 40 min     PT Therapeutic Procedures Time Entry  Manual Therapy Time Entry: 15  Therapeutic Exercise Time Entry: 15  PT Modalities Time Entry  E-Stim (Attended) Time Entry: 10

## 2025-02-10 ENCOUNTER — TREATMENT (OUTPATIENT)
Dept: PHYSICAL THERAPY | Facility: CLINIC | Age: 85
End: 2025-02-10
Payer: MEDICARE

## 2025-02-10 DIAGNOSIS — S46.812S STRAIN OF LEFT TRAPEZIUS MUSCLE, SEQUELA: Primary | ICD-10-CM

## 2025-02-10 DIAGNOSIS — S46.812S TRAPEZIUS MUSCLE STRAIN, LEFT, SEQUELA: ICD-10-CM

## 2025-02-10 DIAGNOSIS — M47.22 CERVICAL SPONDYLOSIS WITH RADICULOPATHY: ICD-10-CM

## 2025-02-10 PROCEDURE — 97140 MANUAL THERAPY 1/> REGIONS: CPT | Mod: GP

## 2025-02-10 NOTE — PROGRESS NOTES
Physical Therapy    Physical Therapy Treatment    Patient Name: Ludivina Erwin  MRN: [id number]  Today's Date: 2/10/2025  Time Calculation  Start Time: 0949  Stop Time: 1030  Time Calculation (min): 41 min    Current Problem  Problem List Items Addressed This Visit             ICD-10-CM    Strain of trapezius muscle - Primary S46.819A    Cervical spondylosis with radiculopathy M47.22     Other Visit Diagnoses         Codes    Trapezius muscle strain, left, sequela     S46.812S            Insurance - reviewed:  Visit number: 7 (updated 02/10/25)  Insurance Type: Payor: MEDICARE / Plan: MEDICARE PART A AND B / Product Type: *No Product type* /   Medicare Certification Period: 1/20/25 - 4/20/25   Surgery: No surgery found, No surgery found.  Days since surgery: No surgery found   Referred by: David Lee MD     Precautions: Fall Risk: Low    + HTN, medically managed  Denies: CA, pacemaker, DM, blood thinner medication use, latex allergy, epilepsy/seizures, or other known cardio/neuro/pulmonary problems   Denies unexpected weight loss/gain, night sweats, or night pain.       General  Subjective      Ludivina Erwin denies any falls or complications since last session.  present with pt for session. Ludivina Erwin reports continued pain and soreness in the L shoulder/neck. Yesterday, her pain was so bad that it led to tears.  reports pt has not been maintaining good posture throughout the day despite his verbal corrections.    Ludivina Erwin reports fair compliance with HEP.    Pain:  8/10  Pain location: L neck and shoulder      Objective     Treatments:  Abbreviation Key  NP = not performed this visit   NV = next visit  // = parallel    Assigned HEP- Medbridge [id number]     Manual Therapy:  41 minutes   Pt seated:  Myofascial cupping to L upper trap, splenius cervicis, and levator scap  STM, DTM, and trigger point release to L upper trap, splenius cervicis, and levator scap  Central PA mobilizations  to C4-T3      Outpatient Education: Pt and  educated on attempting manual massage of her affected muscles at home following use of her heating pad to manage pain. Pt and  educated to use a towel or her hand to help reduce nerve hypersensitivity to her L upper trap when in pain.  Ludivina Erwin verbalizes understanding of all education provided: Yes    Assessment:  Ludivina Erwin completed treatment today which focused upon manual therapy in order to address pain and increased muscle tension in L shoulder and neck.  Ludivina presents with increased muscle tension in L upper trap, splenius cervicis, and levator scap and decreased mobility in her lower cervical and upper thoracic spine.  Ludivina requires education on using massage and a towel at home to assist with pain management and reducing hypersensitivity to the area. She requires continued education on proper postures to maintain at home due to her memory deficits. She reported reduced pain following session. Ludivina's  response to tx was: Reduced pain post treatment. Ludivina's Progress towards goals: Patient reports there has not been a significant change in functional abilities. Ludivina Erwin continues to have decreased strength, decreased ROM, and pain limiting participation in household chores, recreational activities, exercise, and sleep Further skilled therapy services are warranted to address the remaining impairments in order to progress towards functional goals. Ludivina left session with all questions answered and no increase in symptoms.      Plan: Continue manual therapy for pain reduction. Consider pain neuroscience education and other desensitization techniques to reduce nerve hypersensitivity in the affected area.      Time Calculation  Start Time: 0949  Stop Time: 1030  Time Calculation (min): 41 min     PT Therapeutic Procedures Time Entry  Manual Therapy Time Entry: 41

## 2025-02-11 NOTE — PROGRESS NOTES
Physical Therapy    Physical Therapy Treatment    Patient Name: Ludivina Erwin  MRN: [id number]  Today's Date: 2/13/2025  Time Calculation  Start Time: 0930  Stop Time: 1015  Time Calculation (min): 45 min    Current Problem  Problem List Items Addressed This Visit             ICD-10-CM    Strain of trapezius muscle - Primary S46.819A    Cervical spondylosis with radiculopathy M47.22     Other Visit Diagnoses         Codes    Trapezius muscle strain, left, sequela     S46.812S            Insurance - reviewed:  Visit number: 8 (updated 02/13/25)  Insurance Type: Payor: MEDICARE / Plan: MEDICARE PART A AND B / Product Type: *No Product type* /   Medicare Certification Period: 1/20/25 - 4/20/25   Surgery: No surgery found, No surgery found.  Days since surgery: No surgery found   Referred by: David Lee MD     Precautions: Fall Risk: Low    + HTN, medically managed  Denies: CA, pacemaker, DM, blood thinner medication use, latex allergy, epilepsy/seizures, or other known cardio/neuro/pulmonary problems   Denies unexpected weight loss/gain, night sweats, or night pain.       General  Subjective      Ludivina Erwin denies any falls or complications since last session. Ludivina Erwin reports pain continues to be the same in her neck.  present with patient, who reports she is scheduled to have an injection next week for pain.    Ludivina Erwin reports fair compliance with HEP.    Pain:  8/10  Pain location: L neck      Objective     Treatments:  Abbreviation Key  NP = not performed this visit   NV = next visit  // = parallel    Assigned HEP- Med[facility] [id number]     Therapeutic Exercise:  14 minutes    1x10 lower trap activation + scapular depression in supine  1x10 of the following cervical isometrics with 10 sec hold in supine: L side-bending, R rotation, and extension/retraction    Manual Therapy:  31 minutes   Myofascical cupping to L upper trap and SCM in sitting  DTM and trigger point release to L SCM in  "sitting  1 min sub-occipital release in supine      Outpatient Education: Provided manual cues for correct performance of exercises. Provided verbal feedback to improve exercise technique. Discussed pain neuroscience education with patient and  and how to utilize desensitization techniques to reduce pain.    Ludivina Erwin verbalizes understanding of all education provided: Yes    Assessment:  Ludivina Erwin completed treatment today which focused upon manual therapy and isometrics in order to address continuous L neck pain.  Ludivina presents with increased muscle tension and tenderness to deep palpation of L SCM > L upper trap. Ludivina reported myofascial cupping felt soothing but DTM produced greater pain, so myofascial cupping was used the majority of the time for pain management. Ludivina had greater tolerance to cervical isometrics but continues to have L neck pain when the muscles are \"release\" following activation vs when activating the muscles. Reported relief with sub-occipital release.  Ludivina continues to require increased verbal and visual cueing to complete exercises properly.  Ludivina's signs and symptoms may be originating from her neck vs. L upper trap due to the degenerative changes, spondylosis, and foraminal stenosis present via cervical X-RAY. Ludivina's  response to tx was: Reduced pain post treatment. Ludivina's Progress towards goals: Patient reports there has not been a significant change in functional abilities. Ludivina Erwin continues to have decreased strength, decreased ROM, and pain limiting participation in household chores, recreational activities, exercise, and sleep Further skilled therapy services are warranted to address the remaining impairments in order to progress towards functional goals. Ludivina left session with all questions answered and no increase in symptoms.      Plan: Dry needling to L neck. Progress cervical ROM, isometrics, and postural strengthening as robyn.      Time " Calculation  Start Time: 0930  Stop Time: 1015  Time Calculation (min): 45 min     PT Therapeutic Procedures Time Entry  Manual Therapy Time Entry: 31  Therapeutic Exercise Time Entry: 14

## 2025-02-13 ENCOUNTER — TREATMENT (OUTPATIENT)
Dept: PHYSICAL THERAPY | Facility: CLINIC | Age: 85
End: 2025-02-13
Payer: MEDICARE

## 2025-02-13 DIAGNOSIS — M47.22 CERVICAL SPONDYLOSIS WITH RADICULOPATHY: ICD-10-CM

## 2025-02-13 DIAGNOSIS — S46.812S STRAIN OF LEFT TRAPEZIUS MUSCLE, SEQUELA: Primary | ICD-10-CM

## 2025-02-13 DIAGNOSIS — S46.812S TRAPEZIUS MUSCLE STRAIN, LEFT, SEQUELA: ICD-10-CM

## 2025-02-13 PROCEDURE — 97110 THERAPEUTIC EXERCISES: CPT | Mod: GP

## 2025-02-13 PROCEDURE — 97140 MANUAL THERAPY 1/> REGIONS: CPT | Mod: GP

## 2025-02-17 ENCOUNTER — TREATMENT (OUTPATIENT)
Dept: PHYSICAL THERAPY | Facility: CLINIC | Age: 85
End: 2025-02-17
Payer: MEDICARE

## 2025-02-17 DIAGNOSIS — S46.812S STRAIN OF LEFT TRAPEZIUS MUSCLE, SEQUELA: ICD-10-CM

## 2025-02-17 DIAGNOSIS — S46.812S TRAPEZIUS MUSCLE STRAIN, LEFT, SEQUELA: ICD-10-CM

## 2025-02-17 DIAGNOSIS — M47.22 CERVICAL SPONDYLOSIS WITH RADICULOPATHY: ICD-10-CM

## 2025-02-17 PROCEDURE — 97110 THERAPEUTIC EXERCISES: CPT | Mod: GP | Performed by: INTERNAL MEDICINE

## 2025-02-17 PROCEDURE — 97140 MANUAL THERAPY 1/> REGIONS: CPT | Mod: GP | Performed by: INTERNAL MEDICINE

## 2025-02-17 NOTE — PROGRESS NOTES
"Physical Therapy    Physical Therapy Treatment    Patient Name: Ludivina Erwin  MRN: [id number]  Today's Date: 2/17/2025  Time Calculation  Start Time: 1505  Stop Time: 1545  Time Calculation (min): 40 min    Current Problem  Problem List Items Addressed This Visit             ICD-10-CM       Musculoskeletal and Injuries    Strain of trapezius muscle S46.819A       Neuro    Cervical spondylosis with radiculopathy M47.22     Other Visit Diagnoses         Codes    Trapezius muscle strain, left, sequela     S46.812S              Insurance - reviewed:  Visit number: 9 (updated 02/17/25)  Insurance Type: Payor: MEDICARE / Plan: MEDICARE PART A AND B / Product Type: *No Product type* /   Medicare Certification Period: 1/20/25 - 4/20/25   Surgery: No surgery found, No surgery found.  Days since surgery: No surgery found   Referred by: David Lee MD     Precautions: Fall Risk: Low    + HTN, medically managed  Denies: CA, pacemaker, DM, blood thinner medication use, latex allergy, epilepsy/seizures, or other known cardio/neuro/pulmonary problems   Denies unexpected weight loss/gain, night sweats, or night pain.       General  Subjective      Ludivina Erwin denies any falls or complications since last session. Ludivina Erwin reports pain continues to be the same in her neck.  present with patient. Pt reports feeling nervous about the unknown (DN) today.    Ludivina Erwin reports fair compliance with HEP.    Pain:  7/10  Pain location: L neck      Objective     Treatments:  Abbreviation Key  NP = not performed this visit   NV = next visit  // = parallel    Assigned HEP- Med[facility] [id number]     Attended E-stim:  Pt R s/l with pillow under arm:  DN to R mid and distal UT 0.23x30 mm + e-stim x5 mins with the following parameters:  Channel 1: intensity = 4 and frequency = 4    Therapeutic Activity:  Answered questions and educated rationale around DN    Therapeutic Exercise:  10 minutes    Seated L UT stretch 2x30\" " "holds + time for demo  Seated L levator scap 2x30\" holds + time for demos  Supine LT setting 10x5\"      The following were NP this session:   1x10 lower trap activation + scapular depression in supine  1x10 of the following cervical isometrics with 10 sec hold in supine: L side-bending, R rotation, and extension/retraction    Manual Therapy:  25 minutes   Pt R s/l with pillow under arm:  Time taken for palpation and placement of needles for attended e-stim  STM/DTM and myofascial cupping to L upper trap and SCM in sitting    The following were NP this session:   DTM and trigger point release to L SCM in sitting  1 min sub-occipital release in supine      Outpatient Education:   Answered questions and educated pt and pt spouse about DN + e-stim ; consent gained  Cues/rationale for there-ex and manual tx interventions    Ludivina Erwin verbalizes understanding of all education provided: Yes    Assessment:  Ludivina Erwin completed treatment today which focused upon trial DN + estim and L UT flexibility and LT activation in order to address ongoing L UT pain. Ludivina presents with increased muscle tension and tenderness to L UT. Ludivina has no adverse effects or increased pain during DN + e-stim; pt states increased soreness present.  Ludivina require MOD verbal, tactile, and visual cueing to complete exercises properly. Ludivina's  response to tx was: Reduced pain post treatment. Ludivina's Progress towards goals: Patient reports there has not been a significant change in functional abilities. Ludivina Erwin continues to have decreased strength, decreased ROM, and pain limiting participation in household chores, recreational activities, exercise, and sleep Further skilled therapy services are warranted to address the remaining impairments in order to progress towards functional goals. Ludivina left session with all questions answered and no increase in symptoms.      Plan: Monitor response to DN + e-stim to L UT. Progress cervical " ROM, isometrics, and postural strengthening as robyn.      Time Calculation  Start Time: 1505  Stop Time: 1545  Time Calculation (min): 40 min     PT Therapeutic Procedures Time Entry  Manual Therapy Time Entry: 25  Therapeutic Exercise Time Entry: 10  PT Modalities Time Entry  E-Stim (Attended) Time Entry: 5

## 2025-02-19 ENCOUNTER — OFFICE VISIT (OUTPATIENT)
Dept: PAIN MEDICINE | Facility: CLINIC | Age: 85
End: 2025-02-19
Payer: MEDICARE

## 2025-02-19 VITALS
WEIGHT: 140 LBS | HEART RATE: 63 BPM | OXYGEN SATURATION: 97 % | RESPIRATION RATE: 18 BRPM | HEIGHT: 62 IN | TEMPERATURE: 96.8 F | DIASTOLIC BLOOD PRESSURE: 72 MMHG | SYSTOLIC BLOOD PRESSURE: 165 MMHG | BODY MASS INDEX: 25.76 KG/M2

## 2025-02-19 DIAGNOSIS — M50.30 DEGENERATION OF INTERVERTEBRAL DISC OF CERVICAL REGION: ICD-10-CM

## 2025-02-19 DIAGNOSIS — M79.18 MYOFASCIAL PAIN SYNDROME, CERVICAL: Primary | ICD-10-CM

## 2025-02-19 DIAGNOSIS — M54.2 MYOFASCIAL NECK PAIN: ICD-10-CM

## 2025-02-19 PROCEDURE — 2500000004 HC RX 250 GENERAL PHARMACY W/ HCPCS (ALT 636 FOR OP/ED): Performed by: NURSE PRACTITIONER

## 2025-02-19 PROCEDURE — 20553 NJX 1/MLT TRIGGER POINTS 3/>: CPT | Performed by: NURSE PRACTITIONER

## 2025-02-19 PROCEDURE — 99213 OFFICE O/P EST LOW 20 MIN: CPT | Performed by: NURSE PRACTITIONER

## 2025-02-19 PROCEDURE — 96372 THER/PROPH/DIAG INJ SC/IM: CPT | Performed by: NURSE PRACTITIONER

## 2025-02-19 RX ORDER — ROPIVACAINE HYDROCHLORIDE 5 MG/ML
10 INJECTION, SOLUTION EPIDURAL; INFILTRATION; PERINEURAL ONCE
Status: COMPLETED | OUTPATIENT
Start: 2025-02-19 | End: 2025-02-19

## 2025-02-19 RX ORDER — TRIAMCINOLONE ACETONIDE 40 MG/ML
40 INJECTION, SUSPENSION INTRA-ARTICULAR; INTRAMUSCULAR ONCE
Status: COMPLETED | OUTPATIENT
Start: 2025-02-19 | End: 2025-02-19

## 2025-02-19 RX ADMIN — TRIAMCINOLONE ACETONIDE 40 MG: 40 INJECTION, SUSPENSION INTRA-ARTICULAR; INTRAMUSCULAR at 08:52

## 2025-02-19 RX ADMIN — ROPIVACAINE HYDROCHLORIDE 50 MG: 5 INJECTION, SOLUTION EPIDURAL; INFILTRATION; PERINEURAL at 08:51

## 2025-02-19 ASSESSMENT — ENCOUNTER SYMPTOMS
OCCASIONAL FEELINGS OF UNSTEADINESS: 1
LOSS OF SENSATION IN FEET: 0
DEPRESSION: 0

## 2025-02-19 ASSESSMENT — PAIN SCALES - GENERAL
PAINLEVEL_OUTOF10: 7
PAINLEVEL_OUTOF10: 7

## 2025-02-19 ASSESSMENT — PAIN - FUNCTIONAL ASSESSMENT: PAIN_FUNCTIONAL_ASSESSMENT: 0-10

## 2025-02-19 ASSESSMENT — PAIN DESCRIPTION - DESCRIPTORS: DESCRIPTORS: THROBBING

## 2025-02-19 NOTE — PROGRESS NOTES
Physical Therapy    Physical Therapy Treatment    Patient Name: Ludivina Erwin  MRN: [id number]  Today's Date: 2/20/2025  Time Calculation  Start Time: 0933  Stop Time: 1013  Time Calculation (min): 40 min    Current Problem  Problem List Items Addressed This Visit             ICD-10-CM    Strain of trapezius muscle - Primary S46.819A    Cervical spondylosis with radiculopathy M47.22     Other Visit Diagnoses         Codes    Trapezius muscle strain, left, sequela     S46.812S              Insurance - reviewed:  Visit number: 10 (updated 02/20/25)  Insurance Type: Payor: MEDICARE / Plan: MEDICARE PART A AND B / Product Type: *No Product type* /   Medicare Certification Period: 1/20/25 - 4/20/25   Surgery: No surgery found, No surgery found.  Days since surgery: No surgery found   Referred by: David Lee MD     Precautions: Fall Risk: Low    + HTN, medically managed  Denies: CA, pacemaker, DM, blood thinner medication use, latex allergy, epilepsy/seizures, or other known cardio/neuro/pulmonary problems   Denies unexpected weight loss/gain, night sweats, or night pain.       General  Subjective      Ludivina Erwin denies any falls or complications since last session. Ludivina Erwin reports seeing a pain management doctor yesterday and receiving injections in her neck for pain. Soreness is improved today (3 or 4) compared to her usual 8. Difficult to distinguish, however, if the dry needling also assisted in the diminishing of pain.    Ludivina Erwin reports poor compliance with HEP.    Pain:  3-4/10  Pain location: L neck to L shoulder      Objective     Treatments:  Abbreviation Key  NP = not performed this visit   NV = next visit  // = parallel    Assigned HEP- [facility] [id number]     Therapeutic Exercise:  10 minutes    1x10 of the following seated cervical AROM: flexion, extension, side bending each direction, rotation each direction, retraction, and protraction; additional set of cervical extension, R  "side-bending, and L rotation due to reporting diminished/unchanged symptoms when moving neck in these directions initially; cervical extension given only for HEP  Reviewed supine lower trap activation and supine scapular retraction for HEP    Manual Therapy:  20 minutes   Myofascical cupping to L upper trap and SCM in sitting    Therapeutic Activity: 10 minutes  Extensive education and illustration of pt's diminished bone strength/density and decreased disc space using the spine model and pt's cervical x-rays and resultant symptoms and \"cracking\" her neck      Outpatient Education: Reviewed home exercise program. Home exercise program instructed and issued. Provided manual cues for correct performance of exercises. Provided verbal feedback to improve exercise technique.   Ludivina Erwin verbalizes understanding of all education provided: Yes    Assessment:  Ludivina Erwin completed treatment today which focused upon manual therapy, education on diagnosis, and repeated movements in order to address pt's neck symptoms.  Ludivina presents with decreased soreness this session so attempted to progress ROM and strengthening exercises. With repeated movements, cervical extension was the only motion that did not increase her soreness after multiple sets. Ludivina requires extensive education each visit on her HEP and potential reasons for her diagnosis. Utilized her x-rays and the spine model to describe the condition of her vertebrae and discs this session.  Ludivina was challenged with all repeated motions except cervical extension because they increased her pain. Pt is not compliant with HEP due to memory issues so pt required education on exercises to complete at home (cervical extension, supine scapular retraction, and supine lower trap activation).  Ludivina's  response to tx was: Patient tolerated therapeutic interventions well and without any adverse events. Ludivina's Progress towards goals: Reduced intensity of pain. Ludivina LEE" Rip continues to have decreased strength, decreased ROM, and pain limiting participation in household chores, recreational activities, exercise, sleep, and attending medical appointments Further skilled therapy services are warranted to address the remaining impairments in order to progress towards functional goals. Ludivina left session with all questions answered and no increase in symptoms.      Plan: Continue manual therapy for pain management. Progress cervical ROM and postural exercises to robyn.      Time Calculation  Start Time: 0933  Stop Time: 1013  Time Calculation (min): 40 min     PT Therapeutic Procedures Time Entry  Manual Therapy Time Entry: 20  Therapeutic Exercise Time Entry: 10  Therapeutic Activity Time Entry: 10

## 2025-02-19 NOTE — PROGRESS NOTES
Pt is a follow up visit and is complaining of pain in her left side of neck that radiates down her left shoulder. Pt states her pain level is a 7/10 on the pain scale. Pt is here for a trigger point injection.

## 2025-02-19 NOTE — PROGRESS NOTES
Patient ID: Ludivina Erwin is a 84 y.o. female.    Inject Trigger Points, >3    Date/Time: 2/19/2025 7:37 AM    Performed by: TINO Rich  Authorized by: TINO Rich  Preparation: Patient was prepped and draped in the usual sterile fashion.  Local anesthesia used: yes  Anesthesia: local infiltration    Anesthesia:  Local anesthesia used: yes  Local Anesthetic: bupivacaine 0.5% without epinephrine    Sedation:  Patient sedated: no    Patient tolerance: patient tolerated the procedure well with no immediate complications  Comments: Left trapezius trigger point injection. (Total 4)  Ready to learn, no apparent learning barriers.  Explained treatment plan.  Patient verbalized understanding. Following review of potential side effects and complications (including but not necessarily limited to infection, allergic reaction, local tissue breakdown, skin blanching, systemic side effects of corticosteroid's, elevation of blood glucose, injury to bodily tissues) they indicated they understood and agreed to proceed.    The procedure was carried out under sterile prep with iodine swab and alcohol. Then size 30G 1.0 in needle was introduced at the muscle mass, after negative aspiration a 1cc of ropivacaine 0.5% plus Kenalog 4 mg was injected.    Patient tolerated the procedure very well and monitored for 10 minutes and left in stable condition.  Plan to follow-up in 1 month.  Yasmany Meehan CNP

## 2025-02-20 ENCOUNTER — TREATMENT (OUTPATIENT)
Dept: PHYSICAL THERAPY | Facility: CLINIC | Age: 85
End: 2025-02-20
Payer: MEDICARE

## 2025-02-20 DIAGNOSIS — S46.812S STRAIN OF LEFT TRAPEZIUS MUSCLE, SEQUELA: Primary | ICD-10-CM

## 2025-02-20 DIAGNOSIS — M47.22 CERVICAL SPONDYLOSIS WITH RADICULOPATHY: ICD-10-CM

## 2025-02-20 DIAGNOSIS — S46.812S TRAPEZIUS MUSCLE STRAIN, LEFT, SEQUELA: ICD-10-CM

## 2025-02-20 PROCEDURE — 97140 MANUAL THERAPY 1/> REGIONS: CPT | Mod: GP

## 2025-02-20 PROCEDURE — 97110 THERAPEUTIC EXERCISES: CPT | Mod: GP

## 2025-02-20 PROCEDURE — 97530 THERAPEUTIC ACTIVITIES: CPT | Mod: GP

## 2025-02-24 ENCOUNTER — TREATMENT (OUTPATIENT)
Dept: PHYSICAL THERAPY | Facility: CLINIC | Age: 85
End: 2025-02-24
Payer: MEDICARE

## 2025-02-24 DIAGNOSIS — S46.812S STRAIN OF LEFT TRAPEZIUS MUSCLE, SEQUELA: Primary | ICD-10-CM

## 2025-02-24 DIAGNOSIS — S46.812S TRAPEZIUS MUSCLE STRAIN, LEFT, SEQUELA: ICD-10-CM

## 2025-02-24 DIAGNOSIS — M47.22 CERVICAL SPONDYLOSIS WITH RADICULOPATHY: ICD-10-CM

## 2025-02-24 PROCEDURE — 97110 THERAPEUTIC EXERCISES: CPT | Mod: GP

## 2025-02-24 NOTE — PROGRESS NOTES
Physical Therapy    Physical Therapy Treatment    Patient Name: Ludivina Erwin  MRN: [id number]  Today's Date: 2/24/2025  Time Calculation  Start Time: 0947  Stop Time: 1028  Time Calculation (min): 41 min    Current Problem  Problem List Items Addressed This Visit             ICD-10-CM    Strain of trapezius muscle - Primary S46.819A    Cervical spondylosis with radiculopathy M47.22     Other Visit Diagnoses         Codes    Trapezius muscle strain, left, sequela     S46.812S                Insurance - reviewed:  Visit number: 11 (updated 02/24/25)  Insurance Type: Payor: MEDICARE / Plan: MEDICARE PART A AND B / Product Type: *No Product type* /   Medicare Certification Period: 1/20/25 - 4/20/25   Surgery: No surgery found, No surgery found.  Days since surgery: No surgery found   Referred by: David Lee MD     Precautions: Fall Risk: Low    + HTN, medically managed  Denies: CA, pacemaker, DM, blood thinner medication use, latex allergy, epilepsy/seizures, or other known cardio/neuro/pulmonary problems   Denies unexpected weight loss/gain, night sweats, or night pain.       General  Subjective      Ludivina Erwin denies any falls or complications since last session. Ludivina Erwin reports feeling about the same since last session. Has not noticed a significant difference in pain reduction since the injection last week.    Ludivina Erwin reports good compliance with HEP. Reports doing her exercises every day.    Pain:  7/10  Pain location: L neck      Objective     Treatments:  Abbreviation Key  NP = not performed this visit   NV = next visit  // = parallel    Assigned HEP- [facility] [id number]     Therapeutic Exercise:  41 minutes    Upper body ergometer, seat 4, no resistance, for 5 min warm-up and subjective; performed clockwise; attempted counter clockwise but pt reported sharpness in L shoulder with this movement  1x10 seated cervical extension  3x10 BUE of the following against yellow TB with scapular  "retraction: seated rows, seated lat pull downs, and standing shoulder extension  3x10 LUE of the following seated against yellow TB: shoulder flexion and bicep curl (added to HEP)  3x10 BUE standing high rows against blue TB  1x10 LUE seated tricep extemsion against yellow TB, further trials discontinued due to pt's difficulty understanding proper muscle technique  1x30 sec BUE standing pec stretch in doorway (added to HEP)      Outpatient Education: Reviewed home exercise program. Home exercise program instructed and issued. Answered questions about home exercise program. Provided manual cues for correct performance of exercises. Provided verbal feedback to improve exercise technique.   Ludivina Erwin verbalizes understanding of all education provided: Yes    Assessment:  Ludivina Erwin completed treatment today which focused upon improving postural strength in order to address L upper trap strain.  Ludivina presents with improved tolerance to strengthening exercises, mainly reporting soreness in L upper trap following shoulder extension. Reported the exercises focused on her back musculature \"felt good.\" Ludivina requires increased verbal and tactile cueing to properly complete exercises, especially those that involved scapular retraction.  Ludivina was challenged with shoulder extension due to increased soreness/sharp pain near L clavivcle.  Ludivina's  response to tx was: Patient tolerated therapeutic interventions well and without any adverse events. Improved independence with home exercises. Ludivina's Progress towards goals: Patient reports there has not been a significant change in functional abilities. Ludivina Erwin continues to have decreased strength, decreased ROM, and pain limiting participation in household chores, recreational activities, sleep, and attending medical appointments Further skilled therapy services are warranted to address the remaining impairments in order to progress towards functional goals. " Ludivina left session with all questions answered and no increase in symptoms.      Plan: Progress postural exercises as robyn. Consider prone/inclined postural exercises. Manual therapy for pain management.      Time Calculation  Start Time: 0947  Stop Time: 1028  Time Calculation (min): 41 min     PT Therapeutic Procedures Time Entry  Therapeutic Exercise Time Entry: 41

## 2025-02-25 ENCOUNTER — APPOINTMENT (OUTPATIENT)
Dept: INTEGRATIVE MEDICINE | Facility: CLINIC | Age: 85
End: 2025-02-25
Payer: MEDICARE

## 2025-02-25 DIAGNOSIS — M99.01 SEGMENTAL AND SOMATIC DYSFUNCTION OF CERVICAL REGION: Primary | ICD-10-CM

## 2025-02-25 DIAGNOSIS — S46.812S TRAPEZIUS MUSCLE STRAIN, LEFT, SEQUELA: ICD-10-CM

## 2025-02-25 DIAGNOSIS — M99.00 SEGMENTAL AND SOMATIC DYSFUNCTION OF HEAD REGION: ICD-10-CM

## 2025-02-25 DIAGNOSIS — M54.2 CERVICALGIA: ICD-10-CM

## 2025-02-25 DIAGNOSIS — M47.22 CERVICAL SPONDYLOSIS WITH RADICULOPATHY: ICD-10-CM

## 2025-02-25 DIAGNOSIS — M99.02 SEGMENTAL AND SOMATIC DYSFUNCTION OF THORACIC REGION: ICD-10-CM

## 2025-02-25 PROCEDURE — 98941 CHIROPRACT MANJ 3-4 REGIONS: CPT | Performed by: CHIROPRACTOR

## 2025-02-25 NOTE — PROGRESS NOTES
Physical Therapy    Physical Therapy Treatment    Patient Name: Ludivina Erwin  MRN: [id number]  Today's Date: 2/27/2025  Time Calculation  Start Time: 0944  Stop Time: 1026  Time Calculation (min): 42 min    Current Problem  Problem List Items Addressed This Visit             ICD-10-CM    Strain of trapezius muscle - Primary S46.819A    Cervical spondylosis with radiculopathy M47.22     Other Visit Diagnoses         Codes    Trapezius muscle strain, left, sequela     S46.812S                  Insurance - reviewed:  Visit number: 12 (updated 02/27/25)  Insurance Type: Payor: MEDICARE / Plan: MEDICARE PART A AND B / Product Type: *No Product type* /   Medicare Certification Period: 1/20/25 - 4/20/25   Surgery: No surgery found, No surgery found.  Days since surgery: No surgery found   Referred by: David Lee MD     Precautions: Fall Risk: Low    + HTN, medically managed  Denies: CA, pacemaker, DM, blood thinner medication use, latex allergy, epilepsy/seizures, or other known cardio/neuro/pulmonary problems   Denies unexpected weight loss/gain, night sweats, or night pain.       General  Subjective      Ludivina Erwin denies any falls or complications since last session. Ludivina Erwin reports soreness is improved today because she hasn't moved much. Pain/soreness has increased to greater than a 3/10 since last session at home but is reduced upon arrival.    Ludivina Erwin reports good compliance with HEP.    Pain:  3/10  Pain location: L neck/shoulder      Objective     Treatments:  Abbreviation Key  NP = not performed this visit   NV = next visit  // = parallel    Assigned HEP- [facility] [id number]     Therapeutic Exercise:  30 minutes    Upper body ergometer, seat 4, no resistance, for 5 min warm-up and subjective; performed clockwise  1x30 sec trial BUE of the following in standing: rows against red TB, shoulder extension against red TB, high rows against black tube TB, shoulder ER and IR against red  "TB  2x30 sec trial BUE of the following inclined on medicine ball: T's and shoulder extension  1x30 sec trial BUE Y's inclined on medicine ball; reported increased symptoms following exercise, so discontinued  1x30 sec trial LUE serratus punches in supine  1 trial LUE serratus alphabet in supine      Manual Therapy:  12 minutes   Pt seated:  Myofasical cupping, DTM and trigger point release to L upper trap and L SCM      Outpatient Education: Reviewed home exercise program. Answered questions about home exercise program. Provided manual cues for correct performance of exercises. Provided verbal feedback to improve exercise technique.   Ludivina Erwin verbalizes understanding of all education provided: Yes    Assessment:  Ludivina Erwin completed treatment today which focused upon postural strengthening and manual therapy in order to address L upper trap strain.  Ludivina presents with improved tolerance to postural strengthening exercises with minimal increase in soreness. Continues to have increased muscle tension and decreased tolerance to deep pressure in L upper trap > SCM. Ludivina requires hand over hand tactile cueing to complete most postural strengthening exercises, especially high rows and T's.  Ludivina was challenged with maintaining scapular protraction in supine when \"writing\" the alphabet, requiring tactile and verbal cueing throughout.  Ludivina's  response to tx was: Patient tolerated therapeutic interventions well and without any adverse events. Reduced pain post treatment. Improved tolerance to strengthening progressions. Ludivina's Progress towards goals: Reduced pain level. Ludivina Erwin continues to have decreased strength, decreased ROM, and pain limiting participation in household chores, recreational activities, exercise, and sleep Further skilled therapy services are warranted to address the remaining impairments in order to progress towards functional goals. Ludivina left session with all questions " answered and no increase in symptoms.      Plan: Recheck next session. Update HEP.      Time Calculation  Start Time: 0944  Stop Time: 1026  Time Calculation (min): 42 min     PT Therapeutic Procedures Time Entry  Manual Therapy Time Entry: 12  Therapeutic Exercise Time Entry: 30

## 2025-02-25 NOTE — PROGRESS NOTES
Subjective   Patient ID: Ludivina Erwin is a 84 y.o. female who presents February 25, 2025 for neck pain.    No limit-Medicare    Today, the patient rates their degree of pain as a 7 out of 10 on the numeric pain rating scale.     Ludivina returns for continued treatment of neck pain. Patient states that she is okay today. She states that she is having a good day. She states that she had mild relief in symptoms after last visit that was short lived. She states that she has had many sessions of PT and has not noted any significant change in symptoms from therapy. He states that she had an injection in her left trap last week and she has noted benefit from that. Denies any associated symptoms or change in medical history since last visit.   _________________________________________________  Initial Exam:  Ludivina presents for evaluation and treatment of neck pain. Patient states that neck pain began about 3 months ago without injury/trauma/accident. She states that symptoms start at the top of the shoulder and go up to the base of the skull on the left. She states that she has has head pain on the left side of the back of her skull. She denies any radicular symptoms. She states that any movement of the head increases symptoms. She states that ibuprofen, heat, and lying down temporarily reduce symptoms. She states that she had a steroid injection and oral steroids and did not note any improvement in symptoms from them. She states that she hears a cracking noise in her neck every time she moves her head. Patient denies any headaches, difficulty speaking/swallowing, vision changes, bowel/bladder issues, chest pain/shortness of breath, numbness/tingling.          Objective   Physical Exam  Constitutional:       General: She is not in acute distress.     Appearance: Normal appearance.       HENT:      Head: Normocephalic and atraumatic.   Musculoskeletal:      Cervical back: Torticollis, tenderness and crepitus present. Pain  with movement present. Decreased range of motion.      Thoracic back: Tenderness present.   Neurological:      General: No focal deficit present.      Mental Status: She is alert and oriented to person, place, and time.      Cranial Nerves: No dysarthria or facial asymmetry.      Sensory: Sensation is intact.      Motor: Motor function is intact.      Coordination: Coordination is intact.      Gait: Gait is intact.   Psychiatric:         Attention and Perception: Attention normal.         Mood and Affect: Mood normal.         Speech: Speech normal.         Behavior: Behavior is cooperative.         Palpation of the following regions revealed:  Cervical: Suboccipitals left, hypertonicity and tenderness.  Upper trapezius left, hypertonicity and tenderness.  Levator scapulae bilateral, muscular hypertonicity.  Cervical paraspinals bilateral, hypertonicity and tenderness.  Thoracic: Thoracic paraspinals bilateral, muscular hypertonicity.    Segmental Joint(s): Segmental joint dysfunction was assessed with motion palpation and is identified in the following areas:  Cervical : C0 C1 C4 C7  Thoracic : T1 and T2.    Assessment/Plan   Today's Treatment Included: Spinal manipulation to the following regions of segmental dysfunction identified on examination, using age-appropriate and injury specific force. Segmental Joint(s) Cervical : C0 C1 C4 C7 Segmental Joint(s) Thoracic : T1 and T2.   Chiropractic manipulation treatment techniques utilized: Diversified CMT and Cervical Manual Traction  Soft tissue mobilization techniques utilized during treatment: Pin and Stretch and Ischemic compression    Soft-tissue mobilization was performed in the following areas:  Suboccipital left, Cervical paraspinal mm. bilateral, and Upper Trapezius bilateral    Recommended follow-up in 1 week(s).     The patient tolerated today's treatment with little or no additional discomfort and was instructed to contact the office for questions or  concerns.

## 2025-02-27 ENCOUNTER — TREATMENT (OUTPATIENT)
Dept: PHYSICAL THERAPY | Facility: CLINIC | Age: 85
End: 2025-02-27
Payer: MEDICARE

## 2025-02-27 DIAGNOSIS — S46.812S TRAPEZIUS MUSCLE STRAIN, LEFT, SEQUELA: ICD-10-CM

## 2025-02-27 DIAGNOSIS — M47.22 CERVICAL SPONDYLOSIS WITH RADICULOPATHY: ICD-10-CM

## 2025-02-27 DIAGNOSIS — S46.812S STRAIN OF LEFT TRAPEZIUS MUSCLE, SEQUELA: Primary | ICD-10-CM

## 2025-02-27 PROCEDURE — 97140 MANUAL THERAPY 1/> REGIONS: CPT | Mod: GP

## 2025-02-27 PROCEDURE — 97110 THERAPEUTIC EXERCISES: CPT | Mod: GP

## 2025-02-28 NOTE — PROGRESS NOTES
Physical Therapy    Physical Therapy Treatment    Patient Name: Ludivina Erwin  MRN: [id number]  Today's Date: 3/3/2025  Time Calculation  Start Time: 0947  Stop Time: 1030  Time Calculation (min): 43 min    Current Problem  Problem List Items Addressed This Visit             ICD-10-CM    Strain of trapezius muscle - Primary S46.819A    Cervical spondylosis with radiculopathy M47.22     Other Visit Diagnoses         Codes    Trapezius muscle strain, left, sequela     S46.812S                    Insurance - reviewed:  Visit number: 13 (updated 03/03/25)  Insurance Type: Payor: MEDICARE / Plan: MEDICARE PART A AND B / Product Type: *No Product type* /   Medicare Certification Period: 1/20/25 - 4/20/25   Surgery: No surgery found, No surgery found.  Days since surgery: No surgery found   Referred by: David Lee MD     Precautions: Fall Risk: Low    + HTN, medically managed  Denies: CA, pacemaker, DM, blood thinner medication use, latex allergy, epilepsy/seizures, or other known cardio/neuro/pulmonary problems   Denies unexpected weight loss/gain, night sweats, or night pain.       General  Subjective      Ludivina Erwin denies any falls or complications since last session. Ludivina Erwin reports no pain this morning but has had increased soreness in her L neck since last session. Daughter present for session and reports pt will be receiving another pain injection in the next couple weeks.    Ludivina Erwin reports fair compliance with HEP.    Pain:  0/10 pain but reports 3/10 soreness in L neck/shoulder        Objective     Treatments:  Abbreviation Key  NP = not performed this visit   NV = next visit  // = parallel    Assigned HEP- [employer]      Therapeutic Exercise:  15 minutes    Assess cervical rotation and side-bending in supine but pt reported increased soreness in L neck with movements  1x10 BUE seated rows against red TB; additional set in standing with TB in the door jam to educate on how to complete  "at home (added to HEP)  1x5 BUE standing high rows against red TB; required increased verbal and tactile cueing to complete, so not added to HEP  1x5 LUE seated bicep curl against red TB (HEP review)  1x5 LUE seated shoulder flexion against red TB (HEP review)  Reviewed standing pec stretch in the doorway  Educated pt can continue neck ROM exercises if pain is manageable    Therapeutic Activity: 28 minutes  Goal review (see below)  Cervical spine ROM in sitting measured in degrees via goniometer:  Flexion: 20 degrees  Extension: 29 degrees  Side bending L/R: 31 / 36  Rotation L/R: 40 / 40  *Increased soreness reported in L neck/upper trap region with all motions but greatest with L side-bending       Outpatient Education: Reviewed home exercise program. Home exercise program instructed and issued. Provided manual cues for correct performance of exercises. Provided verbal feedback to improve exercise technique. Educated daughter on pros and cons of use of a posture brace per daughter's question. Educated pt on potential reasons for the \"cracking\" she hears in her neck with certain movements as well as maintaining relaxed shoulders throughout the day to assist with pain management.  Ludivina Erwin verbalizes understanding of all education provided: Yes    Reassessment:   Ludivina JESUS Erwin has completed 13 physical therapy sessions from 1/20/2025 to 3/3/25 to address L upper trapezius strain.  Treatment has included Therapeutic exercise, Manual therapy, Home program instruction and progression, Therapeutic activities, Self care and home management, Instruction in activity modification, Electrical stimulation, and Dry Needling.  she reports patient reports partial compliance with the instructed home exercises..  Ludivina has made some progress towards the established therapy goals but further progress is limited by orthopedic restrictions/decreased movement in the neck due to age, difficulty maintaining good posture " throughout the day, and difficulty remembering the correct exercises to complete and the education provided due to memory issues. Decreased pain observed following injection vs. Physical therapy treatments. At this time I recommend Ludivina follow up with the referring physician for further pain management strategies as needed.    Goals:   Ludivina Erwin will report 50% reduction in tenderness to palpation to indicate healing and to improve household/recreational activities.  3/3/25: GOAL MET     Ludivina Erwin will demonstrate increased functional deep cervical neck flexor strength by sustaining good body mechanics without significant compensation from superficial musculature during simulation of mopping, vacuuming, and lifting objects of the floor in order to enhance functional mobility/ decrease strain on spine with work related activities and housework.  3/3/25: GOAL NOT MET -> has difficulty maintaining good posture throughout therapy and reports increased soreness when completing iADLs at home     Ludivina Erwin  will increase Neck Disability Index (NDI) score by >/=7 points or 14% (minimal clinically important difference) in order to reflect improvement in ADL's/pain reduction and improve QOL. Baseline 1/20/25: 27/50, 54%  3/3/25: GOAL MET -> 13/50    Ludivina Erwin  will demonstrate increase in deep cervical neck flexor strength by sustaining musculature in supine position without pain for >/= 10 seconds without significant compensation from superficial musculature in order to enhance ability to complete ADLs, IADLs, and recreational/ occupational activities.  3/3/25: GOAL MET -> able to sustain DNF activation for 10 sec in supine     Ludivina Erwin  will increase cervical mobility to WNL/symmetrical without pain for unlimited ability to perform home household/occupational/recreational tasks.  3/3/25: GOAL PARTIALLY MET -> ROM has improved but still reports soreness with all movements of her neck  Flexion:  11 (1/20/25) -> 20 (3/3/25)  Extension: 33 (1/20/25) -> 29 (3/3/25)  SB (R/L): 38 / 55 (1/20/25) -> 31 / 36 (3/3/25)  Rotation (R/L): 20 / 30 (1/20/25)  -> 40/40 (3/3/25)    Ludivina Erwin  will demonstrate independence and report compliance with HEP to facilitate independent rehab program upon discharge for long-term maintenance of condition and gains from PT POC.  3/3/25: GOAL MET -> pt has intermittent difficulty completing the correct HEP, however, due to her memory issues     Ludivina Erwin  will demonstrate good posture as noted by requiring < or = 2 postural cues within session, showing knowledge of appropriate spinal alignment to decrease excess strain through the cervical spine to allow for increased ability to perform ADLS (including lifting objects and reaching to high shelves)  3/3/25: GOAL NOT MET -> unable to maintain good posture during sessions without cueing     Ludivina Erwin will decrease pain to 0-2/10 to improve ability to perform household/recreational activities.  3/3/25: GOAL NOT MET -> pain increases above a 2/10 during the week    Plan:  DC from physical therapy at this time and follow up with referring physician as needed for further pain management strategies.      Time Calculation  Start Time: 0947  Stop Time: 1030  Time Calculation (min): 43 min     PT Therapeutic Procedures Time Entry  Therapeutic Exercise Time Entry: 15  Therapeutic Activity Time Entry: 28

## 2025-03-03 ENCOUNTER — TREATMENT (OUTPATIENT)
Dept: PHYSICAL THERAPY | Facility: CLINIC | Age: 85
End: 2025-03-03
Payer: MEDICARE

## 2025-03-03 DIAGNOSIS — S46.812S TRAPEZIUS MUSCLE STRAIN, LEFT, SEQUELA: ICD-10-CM

## 2025-03-03 DIAGNOSIS — S46.812S STRAIN OF LEFT TRAPEZIUS MUSCLE, SEQUELA: Primary | ICD-10-CM

## 2025-03-03 DIAGNOSIS — M47.22 CERVICAL SPONDYLOSIS WITH RADICULOPATHY: ICD-10-CM

## 2025-03-03 PROCEDURE — 97530 THERAPEUTIC ACTIVITIES: CPT | Mod: GP

## 2025-03-03 PROCEDURE — 97110 THERAPEUTIC EXERCISES: CPT | Mod: GP

## 2025-03-04 ENCOUNTER — APPOINTMENT (OUTPATIENT)
Dept: INTEGRATIVE MEDICINE | Facility: CLINIC | Age: 85
End: 2025-03-04
Payer: MEDICARE

## 2025-03-06 ENCOUNTER — APPOINTMENT (OUTPATIENT)
Dept: PHYSICAL THERAPY | Facility: CLINIC | Age: 85
End: 2025-03-06
Payer: MEDICARE

## 2025-03-10 ENCOUNTER — APPOINTMENT (OUTPATIENT)
Dept: PHYSICAL THERAPY | Facility: CLINIC | Age: 85
End: 2025-03-10
Payer: MEDICARE

## 2025-03-10 PROBLEM — J30.9 ALLERGIC RHINITIS: Status: RESOLVED | Noted: 2023-11-04 | Resolved: 2025-03-10

## 2025-03-11 ENCOUNTER — APPOINTMENT (OUTPATIENT)
Dept: INTEGRATIVE MEDICINE | Facility: CLINIC | Age: 85
End: 2025-03-11
Payer: MEDICARE

## 2025-03-17 ENCOUNTER — APPOINTMENT (OUTPATIENT)
Dept: PAIN MEDICINE | Facility: CLINIC | Age: 85
End: 2025-03-17
Payer: MEDICARE

## 2025-03-17 VITALS
SYSTOLIC BLOOD PRESSURE: 136 MMHG | TEMPERATURE: 98.2 F | WEIGHT: 140 LBS | HEIGHT: 62 IN | BODY MASS INDEX: 25.76 KG/M2 | RESPIRATION RATE: 16 BRPM | HEART RATE: 57 BPM | OXYGEN SATURATION: 98 % | DIASTOLIC BLOOD PRESSURE: 66 MMHG

## 2025-03-17 DIAGNOSIS — M79.18 MYOFASCIAL PAIN: Primary | ICD-10-CM

## 2025-03-17 DIAGNOSIS — M79.18 MYOFASCIAL PAIN SYNDROME, CERVICAL: Primary | ICD-10-CM

## 2025-03-17 PROCEDURE — 20552 NJX 1/MLT TRIGGER POINT 1/2: CPT | Performed by: NURSE PRACTITIONER

## 2025-03-17 PROCEDURE — 2500000004 HC RX 250 GENERAL PHARMACY W/ HCPCS (ALT 636 FOR OP/ED): Performed by: NURSE PRACTITIONER

## 2025-03-17 PROCEDURE — 96372 THER/PROPH/DIAG INJ SC/IM: CPT | Performed by: NURSE PRACTITIONER

## 2025-03-17 PROCEDURE — 99213 OFFICE O/P EST LOW 20 MIN: CPT | Mod: 25 | Performed by: NURSE PRACTITIONER

## 2025-03-17 RX ORDER — ROPIVACAINE HYDROCHLORIDE 5 MG/ML
10 INJECTION, SOLUTION EPIDURAL; INFILTRATION; PERINEURAL ONCE
Status: COMPLETED | OUTPATIENT
Start: 2025-03-17 | End: 2025-03-17

## 2025-03-17 RX ORDER — TRIAMCINOLONE ACETONIDE 40 MG/ML
40 INJECTION, SUSPENSION INTRA-ARTICULAR; INTRAMUSCULAR ONCE
Status: COMPLETED | OUTPATIENT
Start: 2025-03-17 | End: 2025-03-17

## 2025-03-17 RX ADMIN — TRIAMCINOLONE ACETONIDE 40 MG: 40 INJECTION, SUSPENSION INTRA-ARTICULAR; INTRAMUSCULAR at 10:55

## 2025-03-17 RX ADMIN — ROPIVACAINE HYDROCHLORIDE 50 MG: 5 INJECTION, SOLUTION EPIDURAL; INFILTRATION; PERINEURAL at 10:56

## 2025-03-17 ASSESSMENT — PAIN DESCRIPTION - DESCRIPTORS: DESCRIPTORS: SHARP

## 2025-03-17 ASSESSMENT — PAIN SCALES - GENERAL
PAINLEVEL_OUTOF10: 6
PAINLEVEL_OUTOF10: 6

## 2025-03-17 ASSESSMENT — ENCOUNTER SYMPTOMS
LOSS OF SENSATION IN FEET: 0
OCCASIONAL FEELINGS OF UNSTEADINESS: 1
DEPRESSION: 0

## 2025-03-17 ASSESSMENT — PAIN - FUNCTIONAL ASSESSMENT: PAIN_FUNCTIONAL_ASSESSMENT: 0-10

## 2025-03-17 NOTE — PROGRESS NOTES
Patient ID: Ludivina Erwin is a 84 y.o. female left-sided cervical myofascial pain.    Inject Trigger Point, 1 or 2    Date/Time: 3/17/2025 10:50 AM    Performed by: TINO Rich  Authorized by: TINO Rich  Preparation: Patient was prepped and draped in the usual sterile fashion.  Local anesthesia used: no    Anesthesia:  Local anesthesia used: no    Sedation:  Patient sedated: no    Patient tolerance: patient tolerated the procedure well with no immediate complications  Comments: Left cervical splenius and left trapezius trigger point injection. (Total 4)  Ready to learn, no apparent learning barriers.  Explained treatment plan.  Patient verbalized understanding. Following review of potential side effects and complications (including but not necessarily limited to infection, allergic reaction, local tissue breakdown, skin blanching, systemic side effects of corticosteroid's, elevation of blood glucose, injury to bodily tissues) they indicated they understood and agreed to proceed.    The procedure was carried out under sterile prep with iodine swab and alcohol. Then size 30G 1.0 in needle was introduced at the muscle mass, after negative aspiration a 1cc of ropivacaine 0.5% plus Kenalog 4 mg was injected.    Patient tolerated the procedure very well and had improvement of her symptoms.  Patient monitored for 7 minutes, left in stable condition with her daughter.  Plan follow-up in 4 weeks for repeat cervical trigger point injections.  Yasmany Meehan CNP

## 2025-03-18 ENCOUNTER — APPOINTMENT (OUTPATIENT)
Dept: INTEGRATIVE MEDICINE | Facility: CLINIC | Age: 85
End: 2025-03-18
Payer: MEDICARE

## 2025-03-20 ENCOUNTER — ALLIED HEALTH (OUTPATIENT)
Dept: INTEGRATIVE MEDICINE | Facility: CLINIC | Age: 85
End: 2025-03-20

## 2025-03-20 DIAGNOSIS — M79.18 MYOFASCIAL PAIN: ICD-10-CM

## 2025-03-20 PROCEDURE — MASSG60 MASSAGE 60 MINUTES

## 2025-03-20 NOTE — PROGRESS NOTES
Massage Therapy Visit:     Ludivina Erwin was self-referred.    Condition of Client Subjective :  Patient ID: Ludivina Erwin is a 84 y.o. female who presents for reason for visit of No chief complaint on file..  HPI         Before providing massage therapy, I discussed the provision of massage therapy services and any pertinent issues related to the patient's status with the patient's nurse. I implemented fall prevention measures including handrails, nonskid socks, and having a call light within reach. Following massage therapy, I provided a report to the patient's nurse.    Review of Systems    Objective        Physical Exam    Actions Assessment/Plan :  Provider reviewed plan for the massage session, precautions and contraindications. Patient/guardian/hospital staff has given consent to treat with full understanding of what to expect during the session. Before massage therapy began, provider explained to the patient to communicate at any time if the pressure was causing discomfort past their tolerance level. Patient agreed to advise therapist.         Response:       Evaluation:

## 2025-03-22 PROCEDURE — RXMED WILLOW AMBULATORY MEDICATION CHARGE

## 2025-03-24 ENCOUNTER — HOSPITAL ENCOUNTER (OUTPATIENT)
Dept: CARDIOLOGY | Facility: CLINIC | Age: 85
Discharge: HOME | End: 2025-03-24
Payer: MEDICARE

## 2025-03-24 DIAGNOSIS — R00.1 BRADYCARDIA, UNSPECIFIED: ICD-10-CM

## 2025-03-24 DIAGNOSIS — I35.1 MODERATE AORTIC INSUFFICIENCY: ICD-10-CM

## 2025-03-24 LAB
AORTIC VALVE MEAN GRADIENT: 5 MMHG
AORTIC VALVE PEAK VELOCITY: 1.7 M/S
AV PEAK GRADIENT: 12 MMHG
AVA (PEAK VEL): 1.64 CM2
AVA (VTI): 1.69 CM2
EJECTION FRACTION APICAL 4 CHAMBER: 60.1
EJECTION FRACTION: 63 %
LEFT ATRIUM VOLUME AREA LENGTH INDEX BSA: 35.2 ML/M2
LEFT VENTRICLE INTERNAL DIMENSION DIASTOLE: 4.35 CM (ref 3.5–6)
LEFT VENTRICULAR OUTFLOW TRACT DIAMETER: 2 CM
RIGHT VENTRICLE FREE WALL PEAK S': 0.15 CM/S
RIGHT VENTRICLE PEAK SYSTOLIC PRESSURE: 33 MMHG
TRICUSPID ANNULAR PLANE SYSTOLIC EXCURSION: 1.8 CM

## 2025-03-24 PROCEDURE — 93306 TTE W/DOPPLER COMPLETE: CPT

## 2025-03-24 PROCEDURE — 93306 TTE W/DOPPLER COMPLETE: CPT | Performed by: INTERNAL MEDICINE

## 2025-03-25 ENCOUNTER — APPOINTMENT (OUTPATIENT)
Dept: INTEGRATIVE MEDICINE | Facility: CLINIC | Age: 85
End: 2025-03-25
Payer: MEDICARE

## 2025-03-26 ENCOUNTER — PHARMACY VISIT (OUTPATIENT)
Dept: PHARMACY | Facility: CLINIC | Age: 85
End: 2025-03-26
Payer: MEDICARE

## 2025-03-31 ENCOUNTER — APPOINTMENT (OUTPATIENT)
Dept: CARDIOLOGY | Facility: CLINIC | Age: 85
End: 2025-03-31
Payer: MEDICARE

## 2025-04-01 ENCOUNTER — APPOINTMENT (OUTPATIENT)
Dept: CARDIOLOGY | Facility: CLINIC | Age: 85
End: 2025-04-01
Payer: MEDICARE

## 2025-04-07 ENCOUNTER — OFFICE VISIT (OUTPATIENT)
Dept: CARDIOLOGY | Facility: CLINIC | Age: 85
End: 2025-04-07
Payer: MEDICARE

## 2025-04-07 VITALS
HEART RATE: 66 BPM | BODY MASS INDEX: 25.21 KG/M2 | WEIGHT: 137 LBS | DIASTOLIC BLOOD PRESSURE: 84 MMHG | SYSTOLIC BLOOD PRESSURE: 146 MMHG | OXYGEN SATURATION: 97 % | HEIGHT: 62 IN

## 2025-04-07 DIAGNOSIS — Q24.8 PERICARDIAL CYST (HHS-HCC): ICD-10-CM

## 2025-04-07 DIAGNOSIS — K21.9 GASTROESOPHAGEAL REFLUX DISEASE WITHOUT ESOPHAGITIS: ICD-10-CM

## 2025-04-07 DIAGNOSIS — I35.0 NONRHEUMATIC AORTIC VALVE STENOSIS: ICD-10-CM

## 2025-04-07 DIAGNOSIS — I35.1 MODERATE AORTIC INSUFFICIENCY: Primary | ICD-10-CM

## 2025-04-07 LAB
ATRIAL RATE: 60 BPM
P AXIS: 33 DEGREES
P OFFSET: 179 MS
P ONSET: 124 MS
PR INTERVAL: 178 MS
Q ONSET: 213 MS
QRS COUNT: 10 BEATS
QRS DURATION: 78 MS
QT INTERVAL: 392 MS
QTC CALCULATION(BAZETT): 392 MS
QTC FREDERICIA: 392 MS
R AXIS: -20 DEGREES
T AXIS: -2 DEGREES
T OFFSET: 409 MS
VENTRICULAR RATE: 60 BPM

## 2025-04-07 PROCEDURE — 99214 OFFICE O/P EST MOD 30 MIN: CPT | Mod: 25 | Performed by: INTERNAL MEDICINE

## 2025-04-07 PROCEDURE — 1159F MED LIST DOCD IN RCRD: CPT | Performed by: INTERNAL MEDICINE

## 2025-04-07 PROCEDURE — 3078F DIAST BP <80 MM HG: CPT | Performed by: INTERNAL MEDICINE

## 2025-04-07 PROCEDURE — 93010 ELECTROCARDIOGRAM REPORT: CPT | Performed by: INTERNAL MEDICINE

## 2025-04-07 PROCEDURE — 3077F SYST BP >= 140 MM HG: CPT | Performed by: INTERNAL MEDICINE

## 2025-04-07 PROCEDURE — 99214 OFFICE O/P EST MOD 30 MIN: CPT | Performed by: INTERNAL MEDICINE

## 2025-04-07 PROCEDURE — 1160F RVW MEDS BY RX/DR IN RCRD: CPT | Performed by: INTERNAL MEDICINE

## 2025-04-07 PROCEDURE — 1036F TOBACCO NON-USER: CPT | Performed by: INTERNAL MEDICINE

## 2025-04-07 PROCEDURE — 93005 ELECTROCARDIOGRAM TRACING: CPT | Performed by: INTERNAL MEDICINE

## 2025-04-07 PROCEDURE — 1157F ADVNC CARE PLAN IN RCRD: CPT | Performed by: INTERNAL MEDICINE

## 2025-04-07 NOTE — ASSESSMENT & PLAN NOTE
3/24/25 echocardiogram mild aotric stenosis and moderate aortic valve regurgitation, mild miltral vavle senosis from moderate to severe MAC, LVEF = 60-6%%

## 2025-04-07 NOTE — PROGRESS NOTES
"  Subjective  Ludivina Erwin  is a 85 y.o. year old female who presents for aortic insufficiency.  No chest pain, no dyspnea, no palpitations, no edema    Blood pressure 146/84, pulse 66, height 1.575 m (5' 2\"), weight 62.1 kg (137 lb), SpO2 97%.   Duloxetine, Pregabalin, Povidone-iodine, and Tramadol  Past Medical History:   Diagnosis Date    High blood pressure     HL (hearing loss)     Mastodynia 04/07/2015    Breast pain    Personal history of other diseases of the circulatory system     History of hypertension    Personal history of other diseases of the musculoskeletal system and connective tissue     History of fibromyositis    Personal history of other endocrine, nutritional and metabolic disease     History of obesity    Personal history of other mental and behavioral disorders     History of major depression    Personal history of other specified conditions 04/07/2015    History of fibrocystic disease of breast    Systemic lupus erythematosus, unspecified     History of systemic lupus erythematosus (SLE)     Past Surgical History:   Procedure Laterality Date    BREAST BIOPSY  08/28/2013    Biopsy Breast Open    GALLBLADDER SURGERY  08/28/2013    Gallbladder Surgery    HYSTERECTOMY  08/28/2013    Hysterectomy    OTHER SURGICAL HISTORY  02/28/2019    Gallbladder surgery    OTHER SURGICAL HISTORY  06/22/2022    Bladder surgery     Family History   Problem Relation Name Age of Onset    Prostate cancer Father      Breast cancer Paternal Grandmother      Hypertension Other      Heart disease Other       @SOC    Current Outpatient Medications   Medication Sig Dispense Refill    acetaminophen (TylenoL) 325 mg tablet Take 2 tablets (650 mg) by mouth 4 times a day.      acyclovir (Zovirax) 400 mg tablet Take 1 tablet (400 mg) by mouth 2 times a day as needed.      amLODIPine (Norvasc) 2.5 mg tablet Take 1 tablet (2.5 mg) by mouth once daily. 90 tablet 0    atorvastatin (Lipitor) 10 mg tablet Take 1 tablet (10 mg) " by mouth once daily. 90 tablet 1    cholecalciferol (Vitamin D-3) 50 mcg (2,000 unit) capsule Take 1 capsule (50 mcg) by mouth once daily.      ciclopirox (Penlac) 8 % solution       coenzyme Q-10 100 mg capsule Take 1 capsule (100 mg) by mouth once daily.      estradiol (Estrace) 0.01 % (0.1 mg/gram) vaginal cream 1 gram pea size amount 2-3 x per week 42.5 g 3    fluticasone (Flonase) 50 mcg/actuation nasal spray       hydroxychloroquine (Plaquenil) 200 mg tablet Take 1 tablet (200 mg) by mouth once daily. 90 tablet 0    ipratropium (Atrovent) 42 mcg (0.06 %) nasal spray Administer 2 sprays into each nostril 3 times a day as needed for rhinitis. 15 mL 11    lidocaine (Lidoderm) 5 % patch Place 1 patch over 12 hours on the skin once daily. 30 patch 11    meclizine HCl (MECLIZINE ORAL) Take by mouth.      metoprolol succinate XL (Toprol-XL) 25 mg 24 hr tablet Take 0.5 tablets (12.5 mg) by mouth once daily. 45 tablet 0    sertraline (Zoloft) 50 mg tablet Take 1 tablet (50 mg) by mouth once daily.      vibegron (Gemtesa) 75 mg tablet Take 1 tablet (75 mg) by mouth once daily. 90 tablet 3    vitamin B complex (B Complex-Vitamin B12) tablet Take 1 tablet by mouth once daily.       No current facility-administered medications for this visit.        ROS  Review of Systems   All other systems reviewed and are negative.      Physical Exam  Physical Exam  HENT:      Head: Normocephalic and atraumatic.   Cardiovascular:      Rate and Rhythm: Normal rate and regular rhythm.      Heart sounds: Murmur heard.      Crescendo decrescendo systolic murmur is present with a grade of 2/6.   Pulmonary:      Effort: Pulmonary effort is normal.      Breath sounds: Normal breath sounds.   Abdominal:      General: Abdomen is flat.   Musculoskeletal:      Right lower leg: No edema.      Left lower leg: No edema.   Skin:     General: Skin is warm and dry.   Neurological:      General: No focal deficit present.      Mental Status: She is alert  and oriented to person, place, and time.   Psychiatric:         Mood and Affect: Mood normal.         Behavior: Behavior normal.          EKG  Encounter Date: 04/07/25   ECG 12 Lead   Result Value    Ventricular Rate 60    Atrial Rate 60    CO Interval 178    QRS Duration 78    QT Interval 392    QTC Calculation(Bazett) 392    P Axis 33    R Axis -20    T Axis -2    QRS Count 10    Q Onset 213    P Onset 124    P Offset 179    T Offset 409    QTC Fredericia 392    Narrative    Normal sinus rhythm  Minimal voltage criteria for LVH, may be normal variant ( R in aVL )  Anterolateral infarct (cited on or before 31-MAR-2021)  Abnormal ECG  When compared with ECG of 26-MAY-2022 11:54,  No significant change was found       Problem List Items Addressed This Visit       GERD (gastroesophageal reflux disease)    Moderate aortic insufficiency - Primary     3/24/25 echocardiogram mild aotric stenosis and moderate aortic valve regurgitation, mild miltral vavle senosis from moderate to severe MAC, LVEF = 60-6%%         Relevant Orders    ECG 12 Lead (Completed)    Transthoracic Echo (TTE) Complete    Follow Up In Cardiology    Pericardial cyst ([facility])    Relevant Orders    ECG 12 Lead (Completed)    Nonrheumatic aortic valve stenosis     Mild on 3/24/25 echocardiogram              Same meds  Return 6 months with EKG and echocardiogfam a few days prior to return      Lamont Lane MD

## 2025-04-14 ENCOUNTER — OFFICE VISIT (OUTPATIENT)
Dept: PAIN MEDICINE | Facility: CLINIC | Age: 85
End: 2025-04-14
Payer: MEDICARE

## 2025-04-14 VITALS
HEART RATE: 87 BPM | DIASTOLIC BLOOD PRESSURE: 78 MMHG | HEIGHT: 62 IN | RESPIRATION RATE: 16 BRPM | OXYGEN SATURATION: 99 % | BODY MASS INDEX: 25.21 KG/M2 | SYSTOLIC BLOOD PRESSURE: 125 MMHG | WEIGHT: 137 LBS | TEMPERATURE: 98.2 F

## 2025-04-14 DIAGNOSIS — M54.2 CERVICALGIA: ICD-10-CM

## 2025-04-14 DIAGNOSIS — M54.2 MYOFASCIAL NECK PAIN: Primary | ICD-10-CM

## 2025-04-14 PROCEDURE — 99213 OFFICE O/P EST LOW 20 MIN: CPT | Performed by: NURSE PRACTITIONER

## 2025-04-14 PROCEDURE — 20552 NJX 1/MLT TRIGGER POINT 1/2: CPT | Performed by: NURSE PRACTITIONER

## 2025-04-14 PROCEDURE — 2500000004 HC RX 250 GENERAL PHARMACY W/ HCPCS (ALT 636 FOR OP/ED): Performed by: NURSE PRACTITIONER

## 2025-04-14 PROCEDURE — 96372 THER/PROPH/DIAG INJ SC/IM: CPT | Performed by: NURSE PRACTITIONER

## 2025-04-14 RX ORDER — ROPIVACAINE HYDROCHLORIDE 5 MG/ML
10 INJECTION, SOLUTION EPIDURAL; INFILTRATION; PERINEURAL ONCE
Status: COMPLETED | OUTPATIENT
Start: 2025-04-14 | End: 2025-04-14

## 2025-04-14 RX ORDER — TRIAMCINOLONE ACETONIDE 40 MG/ML
40 INJECTION, SUSPENSION INTRA-ARTICULAR; INTRAMUSCULAR ONCE
Status: COMPLETED | OUTPATIENT
Start: 2025-04-14 | End: 2025-04-14

## 2025-04-14 RX ADMIN — TRIAMCINOLONE ACETONIDE 40 MG: 40 INJECTION, SUSPENSION INTRA-ARTICULAR; INTRAMUSCULAR at 11:25

## 2025-04-14 RX ADMIN — ROPIVACAINE HYDROCHLORIDE 50 MG: 5 INJECTION, SOLUTION EPIDURAL; INFILTRATION; PERINEURAL at 11:25

## 2025-04-14 ASSESSMENT — ENCOUNTER SYMPTOMS
OCCASIONAL FEELINGS OF UNSTEADINESS: 1
DEPRESSION: 1
LOSS OF SENSATION IN FEET: 1

## 2025-04-14 ASSESSMENT — PAIN SCALES - GENERAL
PAINLEVEL_OUTOF10: 6
PAINLEVEL_OUTOF10: 6

## 2025-04-14 ASSESSMENT — PAIN - FUNCTIONAL ASSESSMENT: PAIN_FUNCTIONAL_ASSESSMENT: 0-10

## 2025-04-14 ASSESSMENT — PAIN DESCRIPTION - DESCRIPTORS: DESCRIPTORS: SORE

## 2025-04-14 NOTE — PROGRESS NOTES
Patient ID: Ludivina Erwin is a 85 y.o. female with left-sided cervical pain and myofascial adhesions.    Inject Trigger Point, 1 or 2    Date/Time: 4/14/2025 11:06 AM    Performed by: TINO Rich  Authorized by: TINO Rich  Preparation: Patient was prepped and draped in the usual sterile fashion.  Local anesthesia used: yes  Anesthesia: local infiltration    Anesthesia:  Local anesthesia used: yes  Local Anesthetic: bupivacaine 0.5% without epinephrine    Sedation:  Patient sedated: no    Patient tolerance: patient tolerated the procedure well with no immediate complications  Comments: Left-sided splenius cervicis and trapezius trigger point injection. (Total 6)  Ready to learn, no apparent learning barriers.  Explained treatment plan.  Patient verbalized understanding. Following review of potential side effects and complications (including but not necessarily limited to infection, allergic reaction, local tissue breakdown, skin blanching, systemic side effects of corticosteroid's, elevation of blood glucose, injury to bodily tissues) they indicated they understood and agreed to proceed.    The procedure was carried out under sterile prep with iodine swab and alcohol. Then size 30G 1.0 in needle was introduced at the muscle mass, after negative aspiration a 1cc of ropivacaine 0.5% plus Kenalog 4 mg was injected.    Patient tolerated the procedure very well and had improvement of her symptoms.  Recommend applying ice 20 minutes on 20 minutes off to reduce pain and inflammation.  Continue with current pain medication regimen as prescribed.  Plan to follow-up in 1 month to repeat cervical trigger point injections.  Yasmany Meehan CNP

## 2025-04-18 ENCOUNTER — TELEPHONE (OUTPATIENT)
Dept: UROLOGY | Facility: CLINIC | Age: 85
End: 2025-04-18
Payer: MEDICARE

## 2025-04-18 NOTE — TELEPHONE ENCOUNTER
PA for [employer] approved    Outcome  Approved today by [employer] 2017  PA Case: [id number], Status: Approved, Coverage Starts on: 1/17/2025 12:00:00 AM, Coverage Ends on: 4/18/2026 12:00:00 AM.  Effective Date: 1/17/2025  Authorization Expiration Date: 4/18/2026

## 2025-04-24 ENCOUNTER — APPOINTMENT (OUTPATIENT)
Dept: UROLOGY | Facility: CLINIC | Age: 85
End: 2025-04-24
Payer: MEDICARE

## 2025-04-30 ENCOUNTER — APPOINTMENT (OUTPATIENT)
Dept: OTOLARYNGOLOGY | Facility: CLINIC | Age: 85
End: 2025-04-30
Payer: MEDICARE

## 2025-05-15 ENCOUNTER — APPOINTMENT (OUTPATIENT)
Dept: PAIN MEDICINE | Facility: CLINIC | Age: 85
End: 2025-05-15
Payer: MEDICARE

## 2025-05-19 ENCOUNTER — APPOINTMENT (OUTPATIENT)
Dept: OPHTHALMOLOGY | Facility: CLINIC | Age: 85
End: 2025-05-19
Payer: MEDICARE

## 2025-05-19 DIAGNOSIS — H35.3131 EARLY DRY STAGE NONEXUDATIVE AGE-RELATED MACULAR DEGENERATION OF BOTH EYES: ICD-10-CM

## 2025-05-19 DIAGNOSIS — Z79.899 ENCOUNTER FOR LONG-TERM (CURRENT) USE OF HIGH-RISK MEDICATION: Primary | ICD-10-CM

## 2025-05-19 DIAGNOSIS — Z79.899 LONG-TERM USE OF PLAQUENIL: ICD-10-CM

## 2025-05-19 PROCEDURE — 92083 EXTENDED VISUAL FIELD XM: CPT | Performed by: OPTOMETRIST

## 2025-05-19 PROCEDURE — 99214 OFFICE O/P EST MOD 30 MIN: CPT | Performed by: OPTOMETRIST

## 2025-05-19 PROCEDURE — 92134 CPTRZ OPH DX IMG PST SGM RTA: CPT | Mod: BILATERAL PROCEDURE | Performed by: OPTOMETRIST

## 2025-05-19 ASSESSMENT — ENCOUNTER SYMPTOMS
CARDIOVASCULAR NEGATIVE: 0
RESPIRATORY NEGATIVE: 0
EYES NEGATIVE: 1
MUSCULOSKELETAL NEGATIVE: 0
GASTROINTESTINAL NEGATIVE: 0
ENDOCRINE NEGATIVE: 0
ALLERGIC/IMMUNOLOGIC NEGATIVE: 0
HEMATOLOGIC/LYMPHATIC NEGATIVE: 0
CONSTITUTIONAL NEGATIVE: 0
NEUROLOGICAL NEGATIVE: 0
PSYCHIATRIC NEGATIVE: 0

## 2025-05-19 ASSESSMENT — VISUAL ACUITY
CORRECTION_TYPE: GLASSES
OD_CC: 20/30-2
OS_CC: 20/30-1
METHOD: SNELLEN - LINEAR

## 2025-05-19 ASSESSMENT — SLIT LAMP EXAM - LIDS
COMMENTS: NORMAL
COMMENTS: NORMAL

## 2025-05-19 ASSESSMENT — CONF VISUAL FIELD
OD_SUPERIOR_NASAL_RESTRICTION: 0
OS_NORMAL: 1
OS_SUPERIOR_NASAL_RESTRICTION: 0
OD_NORMAL: 1
OS_INFERIOR_TEMPORAL_RESTRICTION: 0
OD_INFERIOR_NASAL_RESTRICTION: 0
OD_SUPERIOR_TEMPORAL_RESTRICTION: 0
OD_INFERIOR_TEMPORAL_RESTRICTION: 0
OS_INFERIOR_NASAL_RESTRICTION: 0
OS_SUPERIOR_TEMPORAL_RESTRICTION: 0
METHOD: COUNTING FINGERS

## 2025-05-19 ASSESSMENT — TONOMETRY
OD_IOP_MMHG: 12
OS_IOP_MMHG: 12
IOP_METHOD: GOLDMANN APPLANATION

## 2025-05-19 ASSESSMENT — REFRACTION_MANIFEST
OD_SPHERE: +0.50
OS_ADD: +2.75
OS_CYLINDER: -0.25
OD_AXIS: 025
OS_AXIS: 152
OS_SPHERE: PLANO
OD_CYLINDER: -0.25
OD_ADD: +2.75

## 2025-05-19 ASSESSMENT — REFRACTION_WEARINGRX
OS_CYLINDER: -0.50
OS_ADD: +2.75
OD_CYLINDER: -0.50
OD_ADD: +2.75
OS_AXIS: 152
OD_AXIS: 025
OS_SPHERE: PLANO
OD_SPHERE: +0.50

## 2025-05-19 ASSESSMENT — EXTERNAL EXAM - RIGHT EYE: OD_EXAM: NORMAL

## 2025-05-19 ASSESSMENT — EXTERNAL EXAM - LEFT EYE: OS_EXAM: NORMAL

## 2025-05-19 NOTE — Clinical Note
Patient seen today for follow up plaquenil testing given changes from 2020 exam to last visit 11/2024. There is no progression on testing or exam findings today. Discussed that current retinal findings more closely resemble mild macular degeneration over true HCQ toxicity. Will continue w/ close monitoring and repeat testing in 6 months. Pt to RTC asap w/ any changes in vision.

## 2025-05-19 NOTE — PROGRESS NOTES
Assessment/Plan   Diagnoses and all orders for this visit:  Encounter for long-term (current) use of high-risk medication  Long-term use of Plaquenil  -     [facility] - OU - Both Eyes  -     Return visit; Future  Small pigmentary changes on OCT, slight progression from 2020. Unclear if d/t HCQ vs variant of normal aging. At this time changes are more c/w age-related macular degeneration (ARMD). Will repeat plaquenil testing in 6 months to monitor. Communication to Dr. Paty Adan in [facility] regarding findings. Pt to RTC ASAP with changes in vision.   Repeat plaquenil in 6 months.     Early dry stage nonexudative age-related macular degeneration of both eyes  -     OCT, Retina - OU - Both Eyes  Discussed imporantance of sun protection outdoors, not smoking, healthy diet and exercise. Consideration for AREDS 2 in future. Healthy diet and exercise. RTC with any changed in vision/on grid.

## 2025-06-11 ENCOUNTER — TELEPHONE (OUTPATIENT)
Dept: PRIMARY CARE | Facility: CLINIC | Age: 85
End: 2025-06-11
Payer: MEDICARE

## 2025-06-11 DIAGNOSIS — I10 PRIMARY HYPERTENSION: ICD-10-CM

## 2025-06-11 DIAGNOSIS — M06.00 SERONEGATIVE RHEUMATOID ARTHRITIS (MULTI): ICD-10-CM

## 2025-06-11 RX ORDER — HYDROXYCHLOROQUINE SULFATE 200 MG/1
200 TABLET, FILM COATED ORAL DAILY
Qty: 90 TABLET | Refills: 0 | Status: SHIPPED | OUTPATIENT
Start: 2025-06-11

## 2025-06-11 RX ORDER — AMLODIPINE BESYLATE 2.5 MG/1
2.5 TABLET ORAL DAILY
Qty: 90 TABLET | Refills: 0 | Status: SHIPPED | OUTPATIENT
Start: 2025-06-11

## 2025-06-15 PROCEDURE — RXMED WILLOW AMBULATORY MEDICATION CHARGE

## 2025-06-18 ENCOUNTER — PHARMACY VISIT (OUTPATIENT)
Dept: PHARMACY | Facility: CLINIC | Age: 85
End: 2025-06-18
Payer: MEDICARE

## 2025-06-21 ENCOUNTER — OFFICE VISIT (OUTPATIENT)
Dept: URGENT CARE | Age: 85
End: 2025-06-21
Payer: MEDICARE

## 2025-06-21 VITALS
DIASTOLIC BLOOD PRESSURE: 75 MMHG | HEIGHT: 62 IN | TEMPERATURE: 98.1 F | WEIGHT: 137 LBS | RESPIRATION RATE: 17 BRPM | HEART RATE: 64 BPM | BODY MASS INDEX: 25.21 KG/M2 | OXYGEN SATURATION: 99 % | SYSTOLIC BLOOD PRESSURE: 170 MMHG

## 2025-06-21 DIAGNOSIS — B02.9 HERPES ZOSTER WITHOUT COMPLICATION: Primary | ICD-10-CM

## 2025-06-21 PROCEDURE — 99203 OFFICE O/P NEW LOW 30 MIN: CPT | Performed by: PHYSICIAN ASSISTANT

## 2025-06-21 PROCEDURE — 3078F DIAST BP <80 MM HG: CPT | Performed by: PHYSICIAN ASSISTANT

## 2025-06-21 PROCEDURE — 1036F TOBACCO NON-USER: CPT | Performed by: PHYSICIAN ASSISTANT

## 2025-06-21 PROCEDURE — 1159F MED LIST DOCD IN RCRD: CPT | Performed by: PHYSICIAN ASSISTANT

## 2025-06-21 PROCEDURE — 3077F SYST BP >= 140 MM HG: CPT | Performed by: PHYSICIAN ASSISTANT

## 2025-06-21 RX ORDER — VALACYCLOVIR HYDROCHLORIDE 1 G/1
1000 TABLET, FILM COATED ORAL 3 TIMES DAILY
Qty: 30 TABLET | Refills: 0 | Status: SHIPPED | OUTPATIENT
Start: 2025-06-21 | End: 2025-07-01

## 2025-06-21 RX ORDER — PREDNISONE 20 MG/1
TABLET ORAL
Qty: 14 TABLET | Refills: 0 | Status: SHIPPED | OUTPATIENT
Start: 2025-06-21

## 2025-06-21 ASSESSMENT — ENCOUNTER SYMPTOMS
GASTROINTESTINAL NEGATIVE: 1
HEMATOLOGIC/LYMPHATIC NEGATIVE: 1
FATIGUE: 0
EYES NEGATIVE: 1
NEUROLOGICAL NEGATIVE: 1
FEVER: 0
RESPIRATORY NEGATIVE: 1
CARDIOVASCULAR NEGATIVE: 1

## 2025-06-21 NOTE — LETTER
June 21, 2025     TINO Johnson  [address]  [place] [address]    Patient: Ludivina Erwin   YOB: 1940   Date of Visit: 6/21/2025        Dear TINO Johnson:    Your patient, Ludivina Erwin was seen in our urgent care department on 6/21/2025. Enclosed is a full report of that visit.  Diagnosed with shingles on right chest. Her blood pressure is high in urgent care.  Pleae arrange follow-up for Ludivina.     If you have any questions or concerns, please don't hesitate to call.           Sincerely,        Levar Parker PA-C      CC: No Recipients       Enclosure

## 2025-06-21 NOTE — PROGRESS NOTES
"Subjective   Patient ID: Ludivina Erwin is a 85 y.o. female. They present today with a chief complaint of Rash (Rash along chest red bumps started 3 days ago getting worse and spreading patient also has right breast pain ).    History of Present Illness  HPI  Right chest and breast rash that is painful.  Looks like little blisters.  She thinks she has shingles.  She is here with her ,   Past Medical History  Allergies as of 06/21/2025 - Reviewed 06/21/2025   Allergen Reaction Noted    Duloxetine Dizziness 11/04/2023    Pregabalin Dizziness 11/04/2023    Povidone-iodine Unknown 11/04/2023    Tramadol Unknown 11/04/2023       Prescriptions Prior to Admission[1]     Medical History[2]    Surgical History[3]     reports that she has never smoked. She has never used smokeless tobacco. She reports that she does not drink alcohol and does not use drugs.    Review of Systems  Review of Systems   Constitutional:  Negative for fatigue and fever.   HENT: Negative.     Eyes: Negative.    Respiratory: Negative.     Cardiovascular: Negative.    Gastrointestinal: Negative.    Skin:  Positive for rash.   Neurological: Negative.    Hematological: Negative.                                   Objective    Vitals:    06/21/25 1002   BP: 176/76   BP Location: Left arm   Patient Position: Sitting   BP Cuff Size: Adult   Pulse: 64   Resp: 17   Temp: 36.7 °C (98.1 °F)   TempSrc: Oral   SpO2: 99%   Weight: 62.1 kg (137 lb)   Height: 1.575 m (5' 2\")     No LMP recorded. Patient is postmenopausal.    Physical Exam  Skin:     Findings: Lesion and rash present. Rash is vesicular.             Comments: Redness and multiple vesicles to her skin . Below and wrapping around breast.  No secondary infection.           Procedures    Point of Care Test & Imaging Results from this visit  No results found for this visit on 06/21/25.   Imaging  No results found.    Cardiology, Vascular, and Other Imaging  No other imaging results found for the past " 2 days      Diagnostic study results (if any) were reviewed by Levar Parker PA-C.    Assessment/Plan   Allergies, medications, history, and pertinent labs/EKGs/Imaging reviewed by Levar Parker PA-C.     Medical Decision Making  Rash on right side of chest under breast around to her thoracic back with rash consistent with shingles.   I will treat with valacyclovir and steroids. She will use Tylenol for breakthrough pain.  Follow-up to see PCP next week. Return here for worse sxs or increasing pain.     Orders and Diagnoses  There are no diagnoses linked to this encounter.    Medical Admin Record      Patient disposition: Home    Electronically signed by Levar Parker PA-C  10:32 AM           [1] (Not in a hospital admission)  [2]   Past Medical History:  Diagnosis Date    High blood pressure     HL (hearing loss)     Mastodynia 04/07/2015    Breast pain    Personal history of other diseases of the circulatory system     History of hypertension    Personal history of other diseases of the musculoskeletal system and connective tissue     History of fibromyositis    Personal history of other endocrine, nutritional and metabolic disease     History of obesity    Personal history of other mental and behavioral disorders     History of major depression    Personal history of other specified conditions 04/07/2015    History of fibrocystic disease of breast    Systemic lupus erythematosus, unspecified     History of systemic lupus erythematosus (SLE)   [3]   Past Surgical History:  Procedure Laterality Date    BREAST BIOPSY  08/28/2013    Biopsy Breast Open    CATARACT EXTRACTION      GALLBLADDER SURGERY  08/28/2013    Gallbladder Surgery    HYSTERECTOMY  08/28/2013    Hysterectomy    OTHER SURGICAL HISTORY  02/28/2019    Gallbladder surgery    OTHER SURGICAL HISTORY  06/22/2022    Bladder surgery

## 2025-06-25 DIAGNOSIS — M06.00 SERONEGATIVE RHEUMATOID ARTHRITIS (MULTI): ICD-10-CM

## 2025-06-25 DIAGNOSIS — I10 PRIMARY HYPERTENSION: ICD-10-CM

## 2025-06-25 RX ORDER — HYDROXYCHLOROQUINE SULFATE 200 MG/1
200 TABLET, FILM COATED ORAL DAILY
Qty: 90 TABLET | Refills: 0 | Status: SHIPPED | OUTPATIENT
Start: 2025-06-25

## 2025-06-25 RX ORDER — AMLODIPINE BESYLATE 2.5 MG/1
2.5 TABLET ORAL DAILY
Qty: 90 TABLET | Refills: 0 | Status: SHIPPED | OUTPATIENT
Start: 2025-06-25

## 2025-07-07 ENCOUNTER — APPOINTMENT (OUTPATIENT)
Dept: PHARMACY | Facility: HOSPITAL | Age: 85
End: 2025-07-07
Payer: MEDICARE

## 2025-07-07 DIAGNOSIS — R35.1 NOCTURIA: ICD-10-CM

## 2025-07-07 DIAGNOSIS — N95.2 VAGINAL ATROPHY: ICD-10-CM

## 2025-07-07 DIAGNOSIS — N39.41 URGE INCONTINENCE: ICD-10-CM

## 2025-07-07 NOTE — PROGRESS NOTES
"  Patient ID: Ludivina Erwin is a 85 y.o. female who presents for Overactive bladder    Referring Provider: Linda Lane  Pt was referred for OAB, med assistance    Preferred Pharmacy:    [facility] - [address]  [address] [place] [id number]  Phone: [phone] Fax: [phone]    [facility] - [address]  [address]  [facility] [address]  Phone: [phone] Fax: [phone]      Subjective      Vaginal Symptoms  Vaginal Dryness: Yes  Recurrent urinary tract infections: Not recent  No results found for: \"ESTRADIOLFRE\", \"PROGESTERONE\", \"ESTRADIOL\", \"FSH\"    Current Treatment:   Estrace vaginal cream applied twice weekly at bedtime    Urinary Symptoms  Medications that may contribute to symptoms:   amlodipine (causes bladder to relax and affects ability to empty properly),   Frequently of bowel movement? Daily  Tobacco use? No  How many protective undergarments are you utilizing daily? All the time, even with myrbetriq changing twice daily. Wears pads/special pants at night time, rarely waking up wet.      What medications have been tried/stopped?   Trospium - stopped due to concern for memory issues  Current medication? Gemtesa 75 mg daily  When started? 9/2024  Side effects? None  Improvement in symptoms? Yes, great improvement      Cardiovascular Health  The ASCVD Risk score (Imani DK, et al., 2019) failed to calculate for the following reasons:    The 2019 ASCVD risk score is only valid for ages 40 to 79    Risk score cannot be calculated because patient has a medical history suggesting prior/existing ASCVD    Lab Results   Component Value Date    CHOL 177 12/09/2022     Lab Results   Component Value Date    HDL 80.4 12/09/2022     No results found for: \"LDLCALC\"  Lab Results   Component Value Date    TRIG 75 12/09/2022     No components found for: \"CHOLHDL\"        Blood Sugar Balance  Lab Results   Component Value Date    GLUCOSE 92 " "05/15/2023     No results found for: \"LEPTIN\", \"INSULFAST\", \"GLUF\"      Thyroid  Lab Results   Component Value Date    TSH 2.17 05/15/2023    THYROIDPAB <28 05/15/2023       Iron Status  No results found for: \"IRON\", \"TIBC\", \"FERRITIN\"     Kidney Function  Lab Results   Component Value Date    GFRF 90 05/15/2023    CREATININE 0.56 05/15/2023       Potassium  Lab Results   Component Value Date    K 3.9 05/15/2023        Vitamin D3  No results found for: \"VITD25\"    Current Outpatient Medications on File Prior to Visit   Medication Sig Dispense Refill    acetaminophen (TylenoL) 325 mg tablet Take 2 tablets (650 mg) by mouth 4 times a day.      acyclovir (Zovirax) 400 mg tablet Take 1 tablet (400 mg) by mouth 2 times a day as needed.      amLODIPine (Norvasc) 2.5 mg tablet Take 1 tablet (2.5 mg) by mouth once daily. 90 tablet 0    atorvastatin (Lipitor) 10 mg tablet Take 1 tablet (10 mg) by mouth once daily. 90 tablet 1    cholecalciferol (Vitamin D-3) 50 mcg (2,000 unit) capsule Take 1 capsule (50 mcg) by mouth once daily.      ciclopirox (Penlac) 8 % solution       coenzyme Q-10 100 mg capsule Take 1 capsule (100 mg) by mouth once daily.      estradiol (Estrace) 0.01 % (0.1 mg/gram) vaginal cream 1 gram pea size amount 2-3 x per week 42.5 g 3    fluticasone (Flonase) 50 mcg/actuation nasal spray       hydroxychloroquine (Plaquenil) 200 mg tablet Take 1 tablet (200 mg) by mouth once daily. 90 tablet 0    ipratropium (Atrovent) 42 mcg (0.06 %) nasal spray Administer 2 sprays into each nostril 3 times a day as needed for rhinitis. 15 mL 11    lidocaine (Lidoderm) 5 % patch Place 1 patch over 12 hours on the skin once daily. 30 patch 11    meclizine HCl (MECLIZINE ORAL) Take by mouth.      metoprolol succinate XL (Toprol-XL) 25 mg 24 hr tablet Take 0.5 tablets (12.5 mg) by mouth once daily. (Patient not taking: Reported on 6/21/2025) 45 tablet 0    predniSONE (Deltasone) 20 mg tablet One tab twice daily with food. 14 " tablet 0    sertraline (Zoloft) 50 mg tablet Take 1 tablet (50 mg) by mouth once daily.      [] valACYclovir (Valtrex) 1 gram tablet Take 1 tablet (1,000 mg) by mouth 3 times a day for 10 days. 30 tablet 0    vibegron (Gemtesa) 75 mg tablet Take 1 tablet (75 mg) by mouth once daily. 90 tablet 3    vitamin B complex (B Complex-Vitamin B12) tablet Take 1 tablet by mouth once daily.       No current facility-administered medications on file prior to visit.        Medication and allergy reconciliation completed     Drug Interactions   None identified at this visit    Assessment/Plan     Patient was experiencing urinary frequency, urgency, and incontinence. Daughter noted good improvement with gemtesa so far, no side effect. Patient continues to use estrace vaginal cream twice weekly, no side effects. Has not noted any UTI or GSM  Spoke with Ruthie daughter  Has tried before: trospium, Myrbetriq  Discussed VAF renewal today. Daughter will be emailing updated document via email      LABS  Lab Results   Component Value Date    GFRF 90 05/15/2023           CONTINUE  Gemtessa 75 mg once daily   Estrace vaginal cream, applied vaginally twice weekly     Patient Assistance Program (PAP) - RENEWAL     Per regulation, patient is due for their annual renewal for their  PAP application. Patient's application is due for renewal by 2025. Patient understands that if proper documentation is not received before renewal date, they will no longer be able to receive approved medication(s) free of charge.     Application for program to be submitted for the following medications: gemtesa, Estrace    [facility] Permanent Address: [facility]   Prescription Insurance:   Yes   Members of Household: 2   Files Taxes: Yes       Patient will be email financial information to pharmacist directly at this [facility] inbox.    Patient verbally reports monthly or yearly income which is less than 400% federal poverty level    Patient aware this  process may take up to 6 weeks.     If approved medication must be filled through [facility] and MEDICATION WILL BE MAILED TO PATIENT.      Follow-up: 10/6/2025 9:40 am     Time spent with pt: Total length of time 10 (minutes) of the encounter and more than 50% was spent counseling the patient.      Rosangela Morrison, PharmD   Meds Clinical Pharmacist  Phone: [phone]     Continue all meds under the continuation of care with the referring provider and clinical pharmacy team.    Verbal consent to manage patient's drug therapy was obtained from the patient and/or an individual authorized to act on behalf of a patient. They were informed they may decline to participate or withdraw from participation in pharmacy services at any time.

## 2025-07-08 ENCOUNTER — OFFICE VISIT (OUTPATIENT)
Dept: PRIMARY CARE | Facility: CLINIC | Age: 85
End: 2025-07-08
Payer: MEDICARE

## 2025-07-08 VITALS
DIASTOLIC BLOOD PRESSURE: 82 MMHG | WEIGHT: 139.4 LBS | OXYGEN SATURATION: 97 % | HEART RATE: 72 BPM | SYSTOLIC BLOOD PRESSURE: 137 MMHG | HEIGHT: 62 IN | BODY MASS INDEX: 25.65 KG/M2 | TEMPERATURE: 97.7 F

## 2025-07-08 DIAGNOSIS — B02.9 HERPES ZOSTER WITHOUT COMPLICATION: Primary | ICD-10-CM

## 2025-07-08 PROCEDURE — 1125F AMNT PAIN NOTED PAIN PRSNT: CPT | Performed by: NURSE PRACTITIONER

## 2025-07-08 PROCEDURE — 1160F RVW MEDS BY RX/DR IN RCRD: CPT | Performed by: NURSE PRACTITIONER

## 2025-07-08 PROCEDURE — 1036F TOBACCO NON-USER: CPT | Performed by: NURSE PRACTITIONER

## 2025-07-08 PROCEDURE — 99213 OFFICE O/P EST LOW 20 MIN: CPT | Performed by: NURSE PRACTITIONER

## 2025-07-08 PROCEDURE — 1159F MED LIST DOCD IN RCRD: CPT | Performed by: NURSE PRACTITIONER

## 2025-07-08 PROCEDURE — 3075F SYST BP GE 130 - 139MM HG: CPT | Performed by: NURSE PRACTITIONER

## 2025-07-08 PROCEDURE — 3079F DIAST BP 80-89 MM HG: CPT | Performed by: NURSE PRACTITIONER

## 2025-07-08 RX ORDER — GABAPENTIN 100 MG/1
100 CAPSULE ORAL NIGHTLY
Qty: 10 CAPSULE | Refills: 0 | Status: SHIPPED | OUTPATIENT
Start: 2025-07-08 | End: 2026-01-04

## 2025-07-08 SDOH — ECONOMIC STABILITY: FOOD INSECURITY: WITHIN THE PAST 12 MONTHS, THE FOOD YOU BOUGHT JUST DIDN'T LAST AND YOU DIDN'T HAVE MONEY TO GET MORE.: NEVER TRUE

## 2025-07-08 SDOH — ECONOMIC STABILITY: FOOD INSECURITY: WITHIN THE PAST 12 MONTHS, YOU WORRIED THAT YOUR FOOD WOULD RUN OUT BEFORE YOU GOT MONEY TO BUY MORE.: NEVER TRUE

## 2025-07-08 ASSESSMENT — LIFESTYLE VARIABLES
SKIP TO QUESTIONS 9-10: 1
HOW OFTEN DO YOU HAVE SIX OR MORE DRINKS ON ONE OCCASION: NEVER
HOW OFTEN DO YOU HAVE A DRINK CONTAINING ALCOHOL: NEVER
AUDIT-C TOTAL SCORE: 0
HOW MANY STANDARD DRINKS CONTAINING ALCOHOL DO YOU HAVE ON A TYPICAL DAY: PATIENT DOES NOT DRINK

## 2025-07-08 ASSESSMENT — ENCOUNTER SYMPTOMS
DEPRESSION: 1
OCCASIONAL FEELINGS OF UNSTEADINESS: 0
LOSS OF SENSATION IN FEET: 0

## 2025-07-08 ASSESSMENT — PAIN SCALES - GENERAL: PAINLEVEL_OUTOF10: 2

## 2025-07-08 NOTE — PROGRESS NOTES
"Subjective   Patient ID: uLdivina Erwin is a 85 y.o. female who presents for Follow-up (Urgent care FUV shingles, right side under breast to the back.).  HPI 85-year-old female presents today as follow-up to urgent care for right thoracic shingles with dried lesions to right upper thoracic spine radiating onto right breast.  No open wounds or drainage.  She is status post prednisone and antiviral.    6/21/2025 urgent care - herpes zoster treated with Valtrex and steroids.    Review of Systems  Review of systems: Present-feeling well. Not present-chills, fatigue and fever.  Skin: See HPI  HEENT: Not present-headache, ear pain, seasonal allergies, nasal congestion, and sore throat.  Neck: Not present-neck pain, neck stiffness and swollen glands.  Respiratory: Not present-difficulty breathing, cough, bloody sputum.  Cardiovascular: Not present-abnormal blood pressure, chest pain, edema, dyspnea on exertion.  Gastrointestinal: Not present-abdominal pain, bloody or very black stools, changes in bowel habits, heartburn, nausea and vomiting.  Genitourinary: Not present-change in bladder habits, hematuria, flank pain and dysuria.  Musculoskeletal: Not present-joint pain, joint swelling, joint redness.  Neurological: Shingles discomfort-right thoracic nerve root.  Not present-dizziness, headache.  Objective   /82   Pulse 72   Temp 36.5 °C (97.7 °F) (Temporal)   Ht 1.575 m (5' 2\")   Wt 63.2 kg (139 lb 6.4 oz)   SpO2 97%   BMI 25.50 kg/m²      Physical Exam  Gen.: Mental status-alert. Gen. appearance-cooperative, well groomed and consistent with stated age. Not in acute distress or sickly. Orientation-oriented to time, place, purpose and person. Build and nutrition-well-nourished and well-developed. Hydration-well-hydrated.    Integumentary: Scabbed areas along right upper thoracic nerve radiating onto right breast.    Color-normal coloration skin. Skin moisture-normal skin moisture.    Head and neck: " Head-normocephalic, atraumatic with no lesions or palpable masses. Face-atraumatic. Neck-full range of motion and subtle. No lymphadenopathy and no nuchal rigidity.     Chest and lung exam: Auscultation-normal breath sounds, no adventitious lung sounds and normal vocal resonance. Chest wall is normal in shape and non-tender.    Cardiovascular: Auscultation: Regular rate and rhythm. Heart sounds-normal heart sounds, S1-S2. No murmurs appreciated. Carotid arteries normal and without bruit.    Abdomen: Non-tender, no rigidity, and no palpable masses. There is no hepatosplenomegaly. Auscultation-auscultation of the abdomen reveals normal bowel sounds throughout.     Peripheral vascular: Normal temperature and no edema bilaterally.    Neurological: Cranial nerves II through XII grossly intact.    Musculoskeletal: Examination of the spine with no step-offs or point tenderness.    Right lower extremity-normal strength and tone, normal range of motion without pain.   Left lower extremity-normal strength and tone, normal range of motion without pain.  Assessment/Plan   Diagnoses and all orders for this visit:  Herpes zoster without complication  -     gabapentin (Neurontin) 100 mg capsule; Take 1 capsule (100 mg) by mouth once daily at bedtime.

## 2025-07-08 NOTE — PATIENT INSTRUCTIONS
Resolving shingles status post Valtrex and prednisone with lingering nerve root pain more pronounced at night.  Options discussed.  Patient and daughter would like to try gabapentin.  Precautions given.  May cause drowsiness.  Patient and daughter verbalized understanding.    Patient states she had dizziness at the time of lyrica use but was unsure if it was related to her vertigo,     Follow-up in the next few weeks or sooner as needed

## 2025-07-14 DIAGNOSIS — N95.2 VAGINAL ATROPHY: ICD-10-CM

## 2025-07-16 RX ORDER — ESTRADIOL 0.1 MG/G
CREAM VAGINAL
Qty: 42.5 G | Refills: 3 | Status: SHIPPED | OUTPATIENT
Start: 2025-07-16

## 2025-08-14 ENCOUNTER — APPOINTMENT (OUTPATIENT)
Dept: OTOLARYNGOLOGY | Facility: CLINIC | Age: 85
End: 2025-08-14
Payer: MEDICARE

## 2025-10-06 ENCOUNTER — APPOINTMENT (OUTPATIENT)
Dept: PHARMACY | Facility: HOSPITAL | Age: 85
End: 2025-10-06
Payer: MEDICARE

## 2025-11-03 ENCOUNTER — APPOINTMENT (OUTPATIENT)
Dept: OPHTHALMOLOGY | Facility: CLINIC | Age: 85
End: 2025-11-03
Payer: MEDICARE